# Patient Record
Sex: FEMALE | Race: WHITE | NOT HISPANIC OR LATINO | Employment: STUDENT | URBAN - METROPOLITAN AREA
[De-identification: names, ages, dates, MRNs, and addresses within clinical notes are randomized per-mention and may not be internally consistent; named-entity substitution may affect disease eponyms.]

---
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
## 2019-01-09 ENCOUNTER — OFFICE VISIT (OUTPATIENT)
Dept: PEDIATRICS CLINIC | Age: 19
End: 2019-01-09
Payer: COMMERCIAL

## 2019-01-09 VITALS
WEIGHT: 154 LBS | DIASTOLIC BLOOD PRESSURE: 62 MMHG | BODY MASS INDEX: 23.42 KG/M2 | RESPIRATION RATE: 20 BRPM | SYSTOLIC BLOOD PRESSURE: 106 MMHG | TEMPERATURE: 98.7 F

## 2019-01-09 DIAGNOSIS — J01.01 ACUTE RECURRENT MAXILLARY SINUSITIS: ICD-10-CM

## 2019-01-09 DIAGNOSIS — J02.9 SORE THROAT: Primary | ICD-10-CM

## 2019-01-09 LAB — S PYO AG THROAT QL: NEGATIVE

## 2019-01-09 PROCEDURE — 87880 STREP A ASSAY W/OPTIC: CPT | Performed by: PEDIATRICS

## 2019-01-09 PROCEDURE — 99213 OFFICE O/P EST LOW 20 MIN: CPT | Performed by: PEDIATRICS

## 2019-01-09 RX ORDER — AZITHROMYCIN 250 MG/1
500 TABLET, FILM COATED ORAL EVERY 24 HOURS
Qty: 10 TABLET | Refills: 0 | Status: SHIPPED | OUTPATIENT
Start: 2019-01-09 | End: 2019-01-14

## 2019-01-09 NOTE — PROGRESS NOTES
Assessment/Plan:   RAPID  STREP -  NEG  RX  AMOXIL       Diagnoses and all orders for this visit:    Sore throat  -     POCT rapid strepA  -     Throat culture    Acute recurrent maxillary sinusitis  -     azithromycin (ZITHROMAX) 250 mg tablet; Take 2 tablets (500 mg total) by mouth every 24 hours for 5 days          Subjective:     Patient ID: Lupis Vazquez is a 22 y o  female  SICK  WITH  SORE  THROAT  FOR  2  DAYS  , HAS  EAR  PRESSURE  AND  PAIN  SINCE  YESTERDAY , HAS  A  RUNNY  NOSE , NO  FEVER   ADOLESCENT  HAVE  HAD  SEVERAL  SINUS  INFECTIONS IN THE  PAST        Review of Systems   Constitutional: Negative for activity change, appetite change and fever  HENT: Positive for congestion, ear pain, rhinorrhea, sore throat and voice change (RASPY)  Eyes: Negative for discharge and redness  Respiratory: Positive for cough (DRY  AND  WET  COUGH , FREQUENT  AT  NIGHT )  Negative for wheezing  Gastrointestinal: Negative for abdominal pain, diarrhea, nausea and vomiting  Genitourinary: Negative for dysuria, frequency and urgency  Musculoskeletal: Negative for myalgias  Skin: Negative for rash  Neurological: Negative for headaches  Psychiatric/Behavioral: Negative for sleep disturbance  Objective:     Physical Exam   Constitutional: She appears well-developed and well-nourished  No distress  HENT:   Right Ear: External ear normal    Left Ear: External ear normal    Nose: Mucosal edema (SWELLING  AND  REDNESS ) present  No rhinorrhea  Right sinus exhibits maxillary sinus tenderness and frontal sinus tenderness  Left sinus exhibits maxillary sinus tenderness and frontal sinus tenderness  Mouth/Throat: Posterior oropharyngeal erythema (MILD) present  No oropharyngeal exudate  SINUS  TENDERNESS  MORE  ON  MAXILLARY  AREAS   Eyes: Conjunctivae are normal  Right eye exhibits no discharge  Left eye exhibits no discharge  Neck: Normal range of motion  Neck supple  No thyromegaly present  Cardiovascular: Normal rate, regular rhythm and normal heart sounds  No murmur heard  Pulmonary/Chest: Effort normal and breath sounds normal  No respiratory distress  She has no wheezes  She has no rales  NOT COUGHING  AT  TIME  OF  VISIT, LUNGS  CLEAR   Abdominal: Soft  She exhibits no mass  There is no tenderness  Musculoskeletal: Normal range of motion  She exhibits no tenderness  Lymphadenopathy:     She has no cervical adenopathy  Neurological: She is alert  Skin: Skin is warm  No rash noted  Psychiatric: She has a normal mood and affect  Vitals reviewed

## 2019-01-11 LAB — B-HEM STREP SPEC QL CULT: NEGATIVE

## 2019-01-12 NOTE — PROGRESS NOTES
Assessment     22 y o , continuing OCP (estrogen/progesterone), no contraindications  Time spent counseling 10    Plan     All questions answered  Subjective      Candice Lucia is a 22 y o  female who presents for contraception counseling  The patient is sexually active  Pertinent past medical history: none  Pt does state that she has noticed more deprssed emotional sx over the last few mths  Does not think sx are significant, does not affect daily activity or routine  No SI/HI  Pt does state periods are much improved very light and short and tolerable  Pt is now sexually active  Aware would advise STD screenings at at least yearly intervals  Pt has no concerns for STD work up at this point but will plan next year w APE  Menstrual History:  OB History      Para Term  AB Living    0 0 0 0 0 0    SAB TAB Ectopic Multiple Live Births    0 0 0 0 0         Menarche age: 14  Patient's last menstrual period was 2018  The following portions of the patient's history were reviewed and updated as appropriate: allergies, current medications, past family history, past medical history, past social history, past surgical history and problem list     Review of Systems  Pertinent items are noted in HPI  Objective      /72 (BP Location: Left arm, Patient Position: Sitting, Cuff Size: Standard)   Ht 5' 8" (1 727 m)   Wt 67 1 kg (148 lb)   LMP 2018   BMI 22 50 kg/m²     General:   alert and oriented, in no acute distress   Heart: regular rate and rhythm, S1, S2 normal, no murmur, click, rub or gallop   Lungs: clear to auscultation bilaterally                   BP Recheck 114/72           Plan:   Pt to continue current pills for now  If hormonal sx are worsened or persistent over the next few mths she will consider trial of a new pill

## 2019-01-14 ENCOUNTER — OFFICE VISIT (OUTPATIENT)
Dept: OBGYN CLINIC | Facility: CLINIC | Age: 19
End: 2019-01-14
Payer: COMMERCIAL

## 2019-01-14 VITALS
BODY MASS INDEX: 22.43 KG/M2 | SYSTOLIC BLOOD PRESSURE: 134 MMHG | WEIGHT: 148 LBS | HEIGHT: 68 IN | DIASTOLIC BLOOD PRESSURE: 72 MMHG

## 2019-01-14 DIAGNOSIS — Z30.41 ENCOUNTER FOR SURVEILLANCE OF CONTRACEPTIVE PILLS: Primary | ICD-10-CM

## 2019-01-14 DIAGNOSIS — N92.0 MENORRHAGIA WITH REGULAR CYCLE: ICD-10-CM

## 2019-01-14 PROCEDURE — 99212 OFFICE O/P EST SF 10 MIN: CPT | Performed by: PHYSICIAN ASSISTANT

## 2019-01-14 RX ORDER — NORETHINDRONE ACETATE AND ETHINYL ESTRADIOL 1MG-20(21)
1 KIT ORAL DAILY
Qty: 28 TABLET | Refills: 11 | Status: SHIPPED | OUTPATIENT
Start: 2019-01-14 | End: 2019-12-12 | Stop reason: SDUPTHER

## 2019-09-11 ENCOUNTER — OFFICE VISIT (OUTPATIENT)
Dept: PEDIATRICS CLINIC | Age: 19
End: 2019-09-11
Payer: COMMERCIAL

## 2019-09-11 VITALS
DIASTOLIC BLOOD PRESSURE: 70 MMHG | BODY MASS INDEX: 21.9 KG/M2 | TEMPERATURE: 98 F | SYSTOLIC BLOOD PRESSURE: 116 MMHG | WEIGHT: 144 LBS

## 2019-09-11 DIAGNOSIS — J01.01 ACUTE RECURRENT MAXILLARY SINUSITIS: Primary | ICD-10-CM

## 2019-09-11 PROCEDURE — 99213 OFFICE O/P EST LOW 20 MIN: CPT | Performed by: PEDIATRICS

## 2019-09-11 RX ORDER — AZITHROMYCIN 250 MG/1
TABLET, FILM COATED ORAL
Qty: 6 TABLET | Refills: 0 | Status: SHIPPED | OUTPATIENT
Start: 2019-09-11 | End: 2019-09-16

## 2019-09-11 NOTE — PROGRESS NOTES
Assessment/Plan: I will treat for sinusitis  Follow up prn  Diagnoses and all orders for this visit:    Acute recurrent maxillary sinusitis  -     azithromycin (ZITHROMAX) 250 mg tablet; Take 2 tablets (500 mg total) by mouth every 24 hours for 1 day, THEN 1 tablet (250 mg total) every 24 hours for 4 days  Subjective:      Patient ID: Orly Paulino is a 20 y o  female  Sinus Problem   This is a new problem  The current episode started in the past 7 days  There has been no fever  Associated symptoms include congestion, ear pain, headaches, sinus pressure, sneezing and a sore throat  Pertinent negatives include no chills, hoarse voice or shortness of breath  Past treatments include acetaminophen  The treatment provided mild relief  The following portions of the patient's history were reviewed and updated as appropriate:   She  has no past medical history on file  She   Patient Active Problem List    Diagnosis Date Noted    Encounter for surveillance of contraceptive pills 01/14/2019    Encounter for initial prescription of contraceptives 10/15/2018    Acute recurrent maxillary sinusitis 11/16/2016    Benign pigmented nevus 08/29/2013     She  has a past surgical history that includes Skin lesion excision (Right)  Her family history includes Diabetes in her brother; Hypertension in her father; Obesity in her mother  She  reports that she has never smoked  She has never used smokeless tobacco  She reports that she does not drink alcohol or use drugs  Current Outpatient Medications   Medication Sig Dispense Refill    azithromycin (ZITHROMAX) 250 mg tablet Take 2 tablets (500 mg total) by mouth every 24 hours for 1 day, THEN 1 tablet (250 mg total) every 24 hours for 4 days  6 tablet 0    norethindrone-ethinyl estradiol (JUNEL FE 1/20) 1-20 MG-MCG per tablet Take 1 tablet by mouth daily 28 tablet 11     No current facility-administered medications for this visit        Current Outpatient Medications on File Prior to Visit   Medication Sig    norethindrone-ethinyl estradiol (JUNEL FE 1/20) 1-20 MG-MCG per tablet Take 1 tablet by mouth daily     No current facility-administered medications on file prior to visit  She has No Known Allergies       Review of Systems   Constitutional: Negative for chills  HENT: Positive for congestion, ear pain, sinus pressure, sneezing and sore throat  Negative for hoarse voice  Respiratory: Negative for shortness of breath  Neurological: Positive for headaches  Objective:      /70   Temp 98 °F (36 7 °C)   Wt 65 3 kg (144 lb)   BMI 21 90 kg/m²          Physical Exam   Constitutional: She appears well-developed and well-nourished  No distress  HENT:   Head: Normocephalic and atraumatic  Right Ear: External ear normal    Left Ear: External ear normal    Nose: Nose normal    Mouth/Throat: Oropharynx is clear and moist  No oropharyngeal exudate  Eyes: Pupils are equal, round, and reactive to light  Conjunctivae and EOM are normal  Right eye exhibits no discharge  Left eye exhibits no discharge  Neck: Neck supple  Cardiovascular: Normal rate, regular rhythm and normal heart sounds  No murmur heard  Pulmonary/Chest: Effort normal and breath sounds normal  No respiratory distress  She has no wheezes  She has no rales  Abdominal: She exhibits no distension and no mass  There is no tenderness  There is no guarding  Lymphadenopathy:     She has cervical adenopathy (anterior and tender)  Neurological: She is alert  Skin: Skin is warm  Vitals reviewed

## 2019-10-15 ENCOUNTER — OFFICE VISIT (OUTPATIENT)
Dept: PEDIATRICS CLINIC | Age: 19
End: 2019-10-15
Payer: COMMERCIAL

## 2019-10-15 VITALS
HEART RATE: 72 BPM | RESPIRATION RATE: 12 BRPM | TEMPERATURE: 97.6 F | BODY MASS INDEX: 23.54 KG/M2 | DIASTOLIC BLOOD PRESSURE: 70 MMHG | SYSTOLIC BLOOD PRESSURE: 118 MMHG | WEIGHT: 150 LBS | HEIGHT: 67 IN

## 2019-10-15 DIAGNOSIS — Z13.31 NEGATIVE DEPRESSION SCREENING: ICD-10-CM

## 2019-10-15 DIAGNOSIS — Z00.00 ENCOUNTER FOR WELL ADULT EXAM WITHOUT ABNORMAL FINDINGS: Primary | ICD-10-CM

## 2019-10-15 PROBLEM — Z30.41 ENCOUNTER FOR SURVEILLANCE OF CONTRACEPTIVE PILLS: Status: RESOLVED | Noted: 2019-01-14 | Resolved: 2019-10-15

## 2019-10-15 PROBLEM — Z30.019 ENCOUNTER FOR INITIAL PRESCRIPTION OF CONTRACEPTIVES: Status: RESOLVED | Noted: 2018-10-15 | Resolved: 2019-10-15

## 2019-10-15 PROCEDURE — 99395 PREV VISIT EST AGE 18-39: CPT | Performed by: PEDIATRICS

## 2019-10-15 PROCEDURE — 99173 VISUAL ACUITY SCREEN: CPT | Performed by: PEDIATRICS

## 2019-10-15 NOTE — PROGRESS NOTES
Subjective:     Kayy Hankins is a 20 y o  female who is brought in for this well child visit  History provided by: patient and mother    Current Issues:  Current concerns: none  regular periods, no issues    The following portions of the patient's history were reviewed and updated as appropriate:   She  has no past medical history on file  She   Patient Active Problem List    Diagnosis Date Noted    Benign pigmented nevus 08/29/2013     She  has a past surgical history that includes Skin lesion excision (Right)  Her family history includes Diabetes in her brother; Hypertension in her father; Obesity in her mother  She  reports that she has never smoked  She has never used smokeless tobacco  She reports that she does not drink alcohol or use drugs  Current Outpatient Medications   Medication Sig Dispense Refill    norethindrone-ethinyl estradiol (JUNEL FE 1/20) 1-20 MG-MCG per tablet Take 1 tablet by mouth daily 28 tablet 11     No current facility-administered medications for this visit  Current Outpatient Medications on File Prior to Visit   Medication Sig    norethindrone-ethinyl estradiol (JUNEL FE 1/20) 1-20 MG-MCG per tablet Take 1 tablet by mouth daily     No current facility-administered medications on file prior to visit  She has No Known Allergies       Well Child Assessment:  Darral Nissen lives with her mother, father and brother  Interval problems include recent illness (sinusitis have resolved)  Interval problems do not include recent injury  Nutrition  Types of intake include vegetables, fruits, meats, eggs, cow's milk, fish, cereals and junk food  Junk food includes desserts and fast food  Dental  The patient has a dental home  The patient brushes teeth regularly  The patient does not floss regularly  Last dental exam was less than 6 months ago  Elimination  Elimination problems do not include constipation, diarrhea or urinary symptoms  There is no bed wetting  Behavioral  Behavioral issues do not include performing poorly at school  Sleep  Average sleep duration (hrs): 4-8  The patient does not snore  There are no sleep problems  Safety  There is no smoking in the home  Home has working smoke alarms? yes  Home has working carbon monoxide alarms? yes  There is no gun in home  School  Grade level in school: [employer] 2nd year  There are no signs of learning disabilities  Child is doing well in school  Review of Systems   Constitutional: Negative for appetite change and fever  HENT: Negative for congestion, ear discharge, ear pain, rhinorrhea and sore throat  Eyes: Negative for discharge, redness and itching  Respiratory: Negative for snoring, cough and chest tightness  Cardiovascular: Negative for chest pain  Gastrointestinal: Negative for abdominal pain, constipation, diarrhea, nausea and vomiting  Genitourinary: Negative for difficulty urinating  Skin: Negative for rash  Neurological: Negative for headaches  Psychiatric/Behavioral: Negative for sleep disturbance  The patient is not nervous/anxious  Objective:       Vitals:    10/15/19 1058   BP: 118/70   Pulse: 72   Resp: 12   Temp: 97 6 °F (36 4 °C)   Weight: 68 kg (150 lb)   Height: 5' 7 25" (1 708 m)     Growth parameters are noted and are appropriate for age  Wt Readings from Last 1 Encounters:   10/15/19 68 kg (150 lb)     Ht Readings from Last 1 Encounters:   10/15/19 5' 7 25" (1 708 m)      Body mass index is 23 32 kg/m²      Vitals:    10/15/19 1058   BP: 118/70   Pulse: 72   Resp: 12   Temp: 97 6 °F (36 4 °C)   Weight: 68 kg (150 lb)   Height: 5' 7 25" (1 708 m)        Hearing Screening    125Hz 250Hz 500Hz 1000Hz 2000Hz 3000Hz 4000Hz 6000Hz 8000Hz   Right ear:     15 15 15     Left ear:     15 15 15     Comments: Pass bilat  R 5000hz 15db  L 5000hz 15db     Visual Acuity Screening    Right eye Left eye Both eyes   Without correction: 20/20 20/20 20/20   With correction:          Physical Exam   Constitutional: She is oriented to person, place, and time  She appears well-developed and well-nourished  No distress  HENT:   Head: Normocephalic and atraumatic  Right Ear: Tympanic membrane and external ear normal    Left Ear: Tympanic membrane and external ear normal    Nose: Nose normal    Mouth/Throat: Oropharynx is clear and moist  No oropharyngeal exudate  Eyes: Pupils are equal, round, and reactive to light  Conjunctivae and EOM are normal  Right eye exhibits no discharge  Left eye exhibits no discharge  Fundi clear   Neck: Normal range of motion  Neck supple  No thyromegaly present  Cardiovascular: Normal rate, regular rhythm and normal heart sounds  No murmur heard  Pulmonary/Chest: Effort normal and breath sounds normal  No respiratory distress  She has no wheezes  She has no rales  Abdominal: Soft  Bowel sounds are normal  She exhibits no distension and no mass  There is no tenderness  There is no guarding  Genitourinary:   Genitourinary Comments: Grover 5 female   Musculoskeletal: Normal range of motion  No scoliosis   Lymphadenopathy:     She has no cervical adenopathy  Neurological: She is alert and oriented to person, place, and time  She has normal reflexes  She displays normal reflexes  No cranial nerve deficit  She exhibits normal muscle tone  Skin: Skin is dry  Diffuse nevi  Psychiatric: She has a normal mood and affect  Her behavior is normal  Judgment and thought content normal    Nursing note and vitals reviewed  Assessment:     Well adolescent  1  Encounter for well adult exam without abnormal findings     2  Negative depression screening     3  BMI 23 0-23 9, adult          Plan:     Referral to dermatology to check multiple moles  1  Anticipatory guidance discussed  Specific topics reviewed: breast self-exam     Nutrition and Exercise Counseling: The patient's Body mass index is 23 32 kg/m²   This is Facility age limit for growth percentiles is 20 years  Nutrition counseling provided:  Reviewed long term health goals and risks of obesity    Exercise counseling provided:  Anticipatory guidance and counseling on exercise and physical activity given      2  Depression screen performed:       Patient screened- Negative    3  Development: appropriate for age    3  Immunizations today: none  Hesitation to all the recommended vaccinations( Influenza and HPV) along with the risk of not vaccinating was addressed  5  Follow-up visit in 1 year for next well child visit, or sooner as needed

## 2019-12-12 DIAGNOSIS — N92.0 MENORRHAGIA WITH REGULAR CYCLE: ICD-10-CM

## 2019-12-12 RX ORDER — NORETHINDRONE ACETATE AND ETHINYL ESTRADIOL AND FERROUS FUMARATE 1MG-20(21)
KIT ORAL
Qty: 28 TABLET | Refills: 1 | Status: SHIPPED | OUTPATIENT
Start: 2019-12-12 | End: 2020-01-14 | Stop reason: SDUPTHER

## 2020-01-14 ENCOUNTER — TELEPHONE (OUTPATIENT)
Dept: OBGYN CLINIC | Facility: CLINIC | Age: 20
End: 2020-01-14

## 2020-01-14 ENCOUNTER — HOSPITAL ENCOUNTER (EMERGENCY)
Facility: HOSPITAL | Age: 20
Discharge: HOME | End: 2020-01-14
Attending: EMERGENCY MEDICINE | Admitting: EMERGENCY MEDICINE
Payer: COMMERCIAL

## 2020-01-14 VITALS
TEMPERATURE: 97 F | HEART RATE: 78 BPM | RESPIRATION RATE: 18 BRPM | DIASTOLIC BLOOD PRESSURE: 96 MMHG | SYSTOLIC BLOOD PRESSURE: 137 MMHG | WEIGHT: 156.53 LBS | BODY MASS INDEX: 24.33 KG/M2 | OXYGEN SATURATION: 100 %

## 2020-01-14 DIAGNOSIS — N92.0 MENORRHAGIA WITH REGULAR CYCLE: ICD-10-CM

## 2020-01-14 DIAGNOSIS — S61.019A THUMB LACERATION: Primary | ICD-10-CM

## 2020-01-14 PROCEDURE — 99282 EMERGENCY DEPT VISIT SF MDM: CPT

## 2020-01-14 PROCEDURE — 12001 RPR S/N/AX/GEN/TRNK 2.5CM/<: CPT | Performed by: PHYSICIAN ASSISTANT

## 2020-01-14 PROCEDURE — 90715 TDAP VACCINE 7 YRS/> IM: CPT | Performed by: PHYSICIAN ASSISTANT

## 2020-01-14 PROCEDURE — 90471 IMMUNIZATION ADMIN: CPT

## 2020-01-14 PROCEDURE — 99282 EMERGENCY DEPT VISIT SF MDM: CPT | Performed by: PHYSICIAN ASSISTANT

## 2020-01-14 RX ADMIN — TETANUS TOXOID, REDUCED DIPHTHERIA TOXOID AND ACELLULAR PERTUSSIS VACCINE, ADSORBED 0.5 ML: 5; 2.5; 8; 8; 2.5 SUSPENSION INTRAMUSCULAR at 14:35

## 2020-01-14 NOTE — ED PROVIDER NOTES
History  Chief Complaint   Patient presents with    Laceration     patient states she cut her left index finger on a metal lid  59-year-old female presenting today with a left thumb laceration that occurred about an hour ago after she sliced it on a piece of serrated metal from the limited full container  States that the bleeding has stopped  She did miss her last tetanus vaccination and is unsure when her last tetanus was  Minimal amount of pain  Denies numbness, paresthesias  Prior to Admission Medications   Prescriptions Last Dose Informant Patient Reported? Taking? JUNEL FE 1/20 1-20 MG-MCG per tablet   No Yes   Sig: TAKE ONE TABLET ONCE DAILY BY MOUTH      Facility-Administered Medications: None       History reviewed  No pertinent past medical history  Past Surgical History:   Procedure Laterality Date    SKIN LESION EXCISION Right     4th toe       Family History   Problem Relation Age of Onset    Obesity Mother     Hypertension Father     Diabetes Brother      I have reviewed and agree with the history as documented  Social History     Tobacco Use    Smoking status: Never Smoker    Smokeless tobacco: Never Used   Substance Use Topics    Alcohol use: No    Drug use: No        Review of Systems   Constitutional: Negative  HENT: Negative  Respiratory: Negative  Cardiovascular: Negative  Gastrointestinal: Negative  Genitourinary: Negative  Musculoskeletal: Negative  Skin: Positive for wound  Negative for color change, pallor and rash  Neurological: Negative  All other systems reviewed and are negative  Physical Exam  Physical Exam   Constitutional: She is oriented to person, place, and time  She appears well-developed and well-nourished  HENT:   Head: Normocephalic and atraumatic  Nose: Nose normal    Eyes: Conjunctivae are normal    Cardiovascular: Normal rate and intact distal pulses     Pulmonary/Chest: Effort normal    S PO2 is 100% indicating adequate oxygenation   Musculoskeletal: Normal range of motion  She exhibits tenderness  She exhibits no edema or deformity  Arms:  Neurological: She is alert and oriented to person, place, and time  Skin: Skin is warm and dry  Capillary refill takes less than 2 seconds  Nursing note and vitals reviewed  Vital Signs  ED Triage Vitals [01/14/20 1352]   Temperature Pulse Respirations Blood Pressure SpO2   (!) 97 °F (36 1 °C) 78 18 137/96 100 %      Temp Source Heart Rate Source Patient Position - Orthostatic VS BP Location FiO2 (%)   Tympanic Monitor Sitting Right arm --      Pain Score       --           Vitals:    01/14/20 1352   BP: 137/96   Pulse: 78   Patient Position - Orthostatic VS: Sitting         Visual Acuity      ED Medications  Medications - No data to display    Diagnostic Studies  Results Reviewed     None                 No orders to display              Procedures  Procedures         ED Course                               MDM  Number of Diagnoses or Management Options  Diagnosis management comments: Thoroughly cleansed wound with Betadine and saline, it is not gaping, there is no adipose involvement  Glue place with 1 Steri-Strips and also placed in a thumb splint  Patient is refusing tetanus vaccination this time after proper encouragement education given by me and the medical staff  Informed to return emergency department for any signs infection including not limited to spreading redness, drainage  Patient is informed to return to the emergency department for worsening of symptoms and was given proper education regarding their diagnosis and symptoms  Otherwise the patient is informed to follow up with their primary care doctor for re-evaluation  The patient verbalizes understanding and agrees with above assessment and plan  All questions were answered  Please Note: Fluency Direct voice recognition software may have been used in the creation of this document  Wrong words or sound a like substitutions may have occurred due to the inherent limitations of the voice software  Amount and/or Complexity of Data Reviewed  Review and summarize past medical records: yes  Independent visualization of images, tracings, or specimens: yes          Disposition  Final diagnoses:   Thumb laceration     Time reflects when diagnosis was documented in both MDM as applicable and the Disposition within this note     Time User Action Codes Description Comment    1/14/2020  2:22 PM [address], [address] Thumb laceration       ED Disposition     ED Disposition Condition Date/Time Comment    Discharge Stable Tue Jan 14, 2020  2:22 PM Maninder Landin discharge to home/self care  Follow-up Information     Follow up With Specialties Details Why Contact Info Additional [phone] Emergency Department Emergency Medicine Go to  If symptoms worsen such as spreading redness, drainage etc [address] [place] [address]  [phone] [facility], [place], [place], [address]          Patient's Medications   Discharge Prescriptions    No medications on file     No discharge procedures on file      ED Provider  Electronically Signed by           Konstantin Inman PA-C  01/14/20 5951

## 2020-01-15 RX ORDER — NORETHINDRONE ACETATE AND ETHINYL ESTRADIOL 1MG-20(21)
1 KIT ORAL EVERY MORNING
Qty: 28 TABLET | Refills: 0 | Status: SHIPPED | OUTPATIENT
Start: 2020-01-15 | End: 2020-02-15 | Stop reason: SDUPTHER

## 2020-01-17 ENCOUNTER — OFFICE VISIT (OUTPATIENT)
Dept: PEDIATRICS CLINIC | Age: 20
End: 2020-01-17
Payer: COMMERCIAL

## 2020-01-17 VITALS
TEMPERATURE: 98 F | BODY MASS INDEX: 23.32 KG/M2 | DIASTOLIC BLOOD PRESSURE: 72 MMHG | WEIGHT: 150 LBS | SYSTOLIC BLOOD PRESSURE: 118 MMHG

## 2020-01-17 DIAGNOSIS — M54.6 ACUTE RIGHT-SIDED THORACIC BACK PAIN: ICD-10-CM

## 2020-01-17 DIAGNOSIS — J01.10 ACUTE NON-RECURRENT FRONTAL SINUSITIS: Primary | ICD-10-CM

## 2020-01-17 PROCEDURE — 99213 OFFICE O/P EST LOW 20 MIN: CPT | Performed by: PEDIATRICS

## 2020-01-17 RX ORDER — AZITHROMYCIN 250 MG/1
TABLET, FILM COATED ORAL
Qty: 6 TABLET | Refills: 0 | Status: SHIPPED | OUTPATIENT
Start: 2020-01-17 | End: 2020-01-22

## 2020-01-17 NOTE — PROGRESS NOTES
Assessment/Plan: I will treat for frontal sinusitis  Follow up prn  Use a heating pad for the back pain  Was in the ED for a finger laceration  The laceration is healing well  Diagnoses and all orders for this visit:    Acute non-recurrent frontal sinusitis  -     azithromycin (ZITHROMAX) 250 mg tablet; Take 2 tablets (500 mg total) by mouth every 24 hours for 1 day, THEN 1 tablet (250 mg total) every 24 hours for 4 days  Acute right-sided thoracic back pain          Subjective:      Patient ID: Latrice Canales is a 20 y o  female  Sinus Problem   This is a new problem  The current episode started in the past 7 days  The problem has been gradually worsening since onset  There has been no fever  Associated symptoms include congestion, coughing, headaches, sinus pressure, sneezing and a sore throat  Pertinent negatives include no chills, ear pain or swollen glands  (Ear congestion) Past treatments include acetaminophen and oral decongestants  The treatment provided mild relief  Cough   This is a new problem  The current episode started in the past 7 days  The cough is productive of sputum  Associated symptoms include headaches, nasal congestion and a sore throat  Pertinent negatives include no chills, ear pain or rhinorrhea  Nothing aggravates the symptoms  She has tried OTC cough suppressant for the symptoms  The treatment provided mild relief  The following portions of the patient's history were reviewed and updated as appropriate:   She  has no past medical history on file  She   Patient Active Problem List    Diagnosis Date Noted    Acute right-sided thoracic back pain 01/17/2020    Acute non-recurrent frontal sinusitis 05/09/2016    Benign pigmented nevus 08/29/2013     She  has a past surgical history that includes Skin lesion excision (Right)  Her family history includes Diabetes in her brother; Hypertension in her father; Obesity in her mother    She  reports that she has never smoked  She has never used smokeless tobacco  She reports that she does not drink alcohol or use drugs  Current Outpatient Medications   Medication Sig Dispense Refill    azithromycin (ZITHROMAX) 250 mg tablet Take 2 tablets (500 mg total) by mouth every 24 hours for 1 day, THEN 1 tablet (250 mg total) every 24 hours for 4 days  6 tablet 0    norethindrone-ethinyl estradiol (JUNEL FE 1/20) 1-20 MG-MCG per tablet Take 1 tablet by mouth every morning 28 tablet 0     No current facility-administered medications for this visit  Current Outpatient Medications on File Prior to Visit   Medication Sig    norethindrone-ethinyl estradiol (JUNEL FE 1/20) 1-20 MG-MCG per tablet Take 1 tablet by mouth every morning     No current facility-administered medications on file prior to visit  She has No Known Allergies       Review of Systems   Constitutional: Negative for chills  HENT: Positive for congestion, sinus pressure, sneezing and sore throat  Negative for ear pain and rhinorrhea  Respiratory: Positive for cough  Musculoskeletal: Positive for back pain (right sided x 1-2 weeks  )  Neurological: Positive for headaches  Objective:      /72   Temp 98 °F (36 7 °C)   Wt 68 kg (150 lb)   BMI 23 32 kg/m²          Physical Exam   Constitutional: She appears well-developed and well-nourished  No distress  HENT:   Head: Normocephalic and atraumatic  Right Ear: External ear normal    Left Ear: External ear normal    Nose: Nose normal    Mouth/Throat: Oropharynx is clear and moist  No oropharyngeal exudate  Eyes: Pupils are equal, round, and reactive to light  Conjunctivae and EOM are normal  Right eye exhibits no discharge  Left eye exhibits no discharge  Neck: Neck supple  Cardiovascular: Normal rate, regular rhythm and normal heart sounds  No murmur heard  Pulmonary/Chest: Effort normal and breath sounds normal  No respiratory distress  She has no wheezes  She has no rales  Abdominal: Soft  Bowel sounds are normal  She exhibits no distension and no mass  There is no tenderness  There is no guarding  Musculoskeletal:   Right sided upper thoracic pain to the touch   Lymphadenopathy:     She has no cervical adenopathy  Neurological: She is alert  Skin: Skin is warm  Vitals reviewed

## 2020-02-12 PROBLEM — Z01.419 WELL WOMAN EXAM: Status: ACTIVE | Noted: 2020-02-12

## 2020-02-12 NOTE — PROGRESS NOTES
Assessment/Plan   Diagnoses and all orders for this visit:    Well woman exam  -     Chlamydia/GC amplified DNA by PCR    Menorrhagia with regular cycle  -     norethindrone-ethinyl estradiol (Junel FE 1/20) 1-20 MG-MCG per tablet; Take 1 tablet by mouth every morning    RLQ fullness  -     US pelvis complete w transvaginal; Future        Discussion    Pap smears will start at age 23 per the ASCCP guidelines  All questions have been answered to her satisfaction  RTO for APE or sooner if needed    Subjective     Pt for first annual GYN Exam/pelvic exam  Pt currently on OCPs  Doing well  She did have some BTB after she messed up her pills but in general doing well  Pt is now sexually active, 2 lifetime partners  No issues w IC  Declines STD work up  I did r/w pt CDC guidelines, will plan screening today  Without GYN complaints or concerns  No issues w IC  Dave Qiu is a 20 y o  female who presents for annual well woman exam    Menarche -14 ; LMP - ; Periods are reg q 28 days and last 5  days; No excessive bleeding; No intermenstrual bleeding or spotting; Cramps are tolerable  No vulvar itch/burn; No vaginal itch/burn; No abn discharge or odor; No urinary sx - burning/pain/frequency/hematuria  (+) SBEs - no breast masses, asymmetry, nipple discharge or bleeding, changes in skin of breast, or breast tenderness bilaterally  No abd/pelvic pain or HAs;   Pt is sexually active in a mutually monog sexual relationship x 6 mths; Second lifetime partner  No issues with intercourse; She desires std cx's but declines BW at this point; Feels safe at home  Current contraception: OCPs    Gardasil - has not had it, declines it at this point  I did r/ w pt and mother recommendations and coverage for HPV  May further consider but declines for now  Handout given  (+) PCP for routine Bw/care; Last Pap - NA  Last STD screen -today    Review of Systems   Constitutional: Negative  Respiratory: Negative  Gastrointestinal: Negative  Endocrine: Negative  Genitourinary: Negative  The following portions of the patient's history were reviewed and updated as appropriate: allergies, current medications, past family history, past medical history, past social history, past surgical history and problem list          OB History        0    Para   0    Term   0       0    AB   0    Living   0       SAB   0    TAB   0    Ectopic   0    Multiple   0    Live Births   0                 History reviewed  No pertinent past medical history      Past Surgical History:   Procedure Laterality Date    SKIN LESION EXCISION Right     4th toe       Family History   Problem Relation Age of Onset    Obesity Mother     Hypertension Father     Diabetes Brother        Social History     Socioeconomic History    Marital status: Single     Spouse name: Not on file    Number of children: Not on file    Years of education: Not on file    Highest education level: Not on file   Occupational History    Not on file   Social Needs    Financial resource strain: Not on file    Food insecurity:     Worry: Not on file     Inability: Not on file    Transportation needs:     Medical: Not on file     Non-medical: Not on file   Tobacco Use    Smoking status: Never Smoker    Smokeless tobacco: Never Used   Substance and Sexual Activity    Alcohol use: No    Drug use: No    Sexual activity: Never   Lifestyle    Physical activity:     Days per week: Not on file     Minutes per session: Not on file    Stress: Not on file   Relationships    Social connections:     Talks on phone: Not on file     Gets together: Not on file     Attends Faith service: Not on file     Active member of club or organization: Not on file     Attends meetings of clubs or organizations: Not on file     Relationship status: Not on file    Intimate partner violence:     Fear of current or ex partner: Not on file     Emotionally abused: Not on file     Physically abused: Not on file     Forced sexual activity: Not on file   Other Topics Concern    Not on file   Social History Narrative    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             Current Outpatient Medications:     norethindrone-ethinyl estradiol (Junel FE 1/20) 1-20 MG-MCG per tablet, Take 1 tablet by mouth every morning, Disp: 28 tablet, Rfl: 11    No Known Allergies    Objective   Vitals:    02/15/20 1110   BP: 120/70   Weight: 68 kg (150 lb)   Height: 5' 7" (1 702 m)     Physical Exam   Constitutional: She is oriented to person, place, and time  She appears well-developed and well-nourished  HENT:   Head: Normocephalic and atraumatic  Cardiovascular: Normal rate and regular rhythm  Pulmonary/Chest: Effort normal and breath sounds normal  Right breast exhibits no inverted nipple, no mass, no nipple discharge, no skin change and no tenderness  Left breast exhibits no inverted nipple, no mass, no nipple discharge, no skin change and no tenderness  Breasts are symmetrical    Abdominal: Soft  She exhibits no distension and no mass  There is no rebound and no guarding  Genitourinary: Uterus normal  Pelvic exam was performed with patient supine  There is no rash on the right labia  There is no rash on the left labia  Cervix exhibits no motion tenderness  Right adnexum displays fullness  Right adnexum displays no tenderness  Left adnexum displays no mass, no tenderness and no fullness  No erythema in the vagina  No foreign body in the vagina  No signs of injury around the vagina  No vaginal discharge found  Genitourinary Comments: On exam, RLQ fullness noted, no pain or discomfort noted  Will plan pelvic US and f/u as needed  Neurological: She is alert and oriented to person, place, and time  Skin: Skin is warm and dry  Psychiatric: She has a normal mood and affect  Patient Instructions   Pap not done due to age and guidelines  Chlam/gc done today  Continue current OCPs     Will plan pelvic US  F/u 1 year, earlier as needed

## 2020-02-15 ENCOUNTER — ANNUAL EXAM (OUTPATIENT)
Dept: OBGYN CLINIC | Facility: CLINIC | Age: 20
End: 2020-02-15
Payer: COMMERCIAL

## 2020-02-15 VITALS
HEIGHT: 67 IN | SYSTOLIC BLOOD PRESSURE: 120 MMHG | DIASTOLIC BLOOD PRESSURE: 70 MMHG | WEIGHT: 150 LBS | BODY MASS INDEX: 23.54 KG/M2

## 2020-02-15 DIAGNOSIS — R19.8 RLQ FULLNESS: ICD-10-CM

## 2020-02-15 DIAGNOSIS — Z01.419 WELL WOMAN EXAM: Primary | ICD-10-CM

## 2020-02-15 DIAGNOSIS — N92.0 MENORRHAGIA WITH REGULAR CYCLE: ICD-10-CM

## 2020-02-15 PROCEDURE — S0612 ANNUAL GYNECOLOGICAL EXAMINA: HCPCS | Performed by: PHYSICIAN ASSISTANT

## 2020-02-15 RX ORDER — NORETHINDRONE ACETATE AND ETHINYL ESTRADIOL 1MG-20(21)
1 KIT ORAL EVERY MORNING
Qty: 28 TABLET | Refills: 11 | Status: SHIPPED | OUTPATIENT
Start: 2020-02-15 | End: 2020-03-09

## 2020-02-15 NOTE — PATIENT INSTRUCTIONS
Pap not done due to age and guidelines  Chlam/gc done today  Continue current OCPs  Will plan pelvic US  F/u 1 year, earlier as needed

## 2020-03-03 ENCOUNTER — TELEPHONE (OUTPATIENT)
Dept: OBGYN CLINIC | Facility: CLINIC | Age: 20
End: 2020-03-03

## 2020-03-03 NOTE — TELEPHONE ENCOUNTER
Pt mom aware test was canceled due to no aptima swab received and will research into what happened  Mom states it was definitely done because she was in the room  Called [facility] was canceled due to no specimen received  Does say that they did receive another specimen but has been discarded at this point  Was unable to locate if specimen was sent out due to wasn't registered in pap book  Specimen collected on 2/15  Please review and let me know what needs to be done  Patient was in on a Saturday

## 2020-03-04 NOTE — TELEPHONE ENCOUNTER
This was without a doubt done in the office that day  I don't severino STD cx's on my pap book  I even remember confirming that we could send it to [facility] since she had  [place] insurance and that I did the right cx    I wanted to make sure it didn't need to go to [facility]

## 2020-03-04 NOTE — TELEPHONE ENCOUNTER
Can you please review this with Ruben Gonzalez? She was working with you this day  I am not sure what to do going forward

## 2020-03-05 NOTE — TELEPHONE ENCOUNTER
Patient mom called in stating she called a couple days ago about daughter's test results and still hasnt received any feedback  Patient mom placed on hold and reviewed patient chart  See recommendation is can do urine  Reviewed with patient mom, specimen ordered incorrectly per Itzel Keene and specimen was no longer viable so discarded  Reviewed options with patient mom ie urine or repeat swab  States daughter is away right now but will let her know and call back with her decision

## 2020-03-05 NOTE — TELEPHONE ENCOUNTER
Discussed with Xi Flannery , I looked into it the lab should have received both a PAP and a Aptima swab , since test was canceled specimen is now no longer viable , not sure who [facility] spoke with at the office and could not clarify test , but we will repeat test via a urine sample have patient come to office [place] or [place] and leave a sample to send out    MM CMA

## 2020-03-09 DIAGNOSIS — N92.0 MENORRHAGIA WITH REGULAR CYCLE: ICD-10-CM

## 2020-03-09 RX ORDER — NORETHINDRONE ACETATE AND ETHINYL ESTRADIOL AND FERROUS FUMARATE 1MG-20(21)
KIT ORAL
Qty: 28 TABLET | Refills: 1 | Status: SHIPPED | OUTPATIENT
Start: 2020-03-09 | End: 2020-04-07

## 2020-04-06 DIAGNOSIS — N92.0 MENORRHAGIA WITH REGULAR CYCLE: ICD-10-CM

## 2020-04-07 RX ORDER — NORETHINDRONE ACETATE AND ETHINYL ESTRADIOL AND FERROUS FUMARATE 1MG-20(21)
KIT ORAL
Qty: 28 TABLET | Refills: 1 | Status: SHIPPED | OUTPATIENT
Start: 2020-04-07 | End: 2020-06-01

## 2020-06-01 DIAGNOSIS — N92.0 MENORRHAGIA WITH REGULAR CYCLE: ICD-10-CM

## 2020-06-01 RX ORDER — NORETHINDRONE ACETATE AND ETHINYL ESTRADIOL AND FERROUS FUMARATE 1MG-20(21)
KIT ORAL
Qty: 28 TABLET | Refills: 1 | Status: SHIPPED | OUTPATIENT
Start: 2020-06-01 | End: 2020-07-27

## 2020-06-03 ENCOUNTER — TELEPHONE (OUTPATIENT)
Dept: OBGYN CLINIC | Facility: CLINIC | Age: 20
End: 2020-06-03

## 2020-06-05 ENCOUNTER — TELEPHONE (OUTPATIENT)
Dept: OBGYN CLINIC | Facility: CLINIC | Age: 20
End: 2020-06-05

## 2020-07-27 DIAGNOSIS — N92.0 MENORRHAGIA WITH REGULAR CYCLE: ICD-10-CM

## 2020-07-27 RX ORDER — NORETHINDRONE ACETATE AND ETHINYL ESTRADIOL AND FERROUS FUMARATE 1MG-20(21)
KIT ORAL
Qty: 28 TABLET | Refills: 1 | Status: SHIPPED | OUTPATIENT
Start: 2020-07-27 | End: 2020-09-22

## 2020-09-17 ENCOUNTER — OFFICE VISIT (OUTPATIENT)
Dept: PEDIATRICS CLINIC | Age: 20
End: 2020-09-17
Payer: COMMERCIAL

## 2020-09-17 VITALS
TEMPERATURE: 98 F | SYSTOLIC BLOOD PRESSURE: 118 MMHG | HEART RATE: 78 BPM | WEIGHT: 155 LBS | HEIGHT: 68 IN | RESPIRATION RATE: 18 BRPM | BODY MASS INDEX: 23.49 KG/M2 | DIASTOLIC BLOOD PRESSURE: 78 MMHG

## 2020-09-17 DIAGNOSIS — Z00.00 ENCOUNTER FOR WELL ADULT EXAM WITHOUT ABNORMAL FINDINGS: Primary | ICD-10-CM

## 2020-09-17 DIAGNOSIS — Z13.31 NEGATIVE DEPRESSION SCREENING: ICD-10-CM

## 2020-09-17 PROBLEM — M54.6 ACUTE RIGHT-SIDED THORACIC BACK PAIN: Status: RESOLVED | Noted: 2020-01-17 | Resolved: 2020-09-17

## 2020-09-17 PROCEDURE — 99395 PREV VISIT EST AGE 18-39: CPT | Performed by: PEDIATRICS

## 2020-09-17 PROCEDURE — 99173 VISUAL ACUITY SCREEN: CPT | Performed by: PEDIATRICS

## 2020-09-17 NOTE — PROGRESS NOTES
Subjective:     Nancy Griffith is a 23 y o  female who is brought in for this well child visit  History provided by: patient    Current Issues:  Current concerns: none  regular periods, no issues    The following portions of the patient's history were reviewed and updated as appropriate:   She  has no past medical history on file  She   Patient Active Problem List    Diagnosis Date Noted    Well woman exam 02/12/2020    Benign pigmented nevus 08/29/2013     She  has a past surgical history that includes Skin lesion excision (Right)  Her family history includes Diabetes in her brother; Hypertension in her father; Obesity in her mother  She  reports that she has never smoked  She has never used smokeless tobacco  She reports current alcohol use  She reports that she does not use drugs  Current Outpatient Medications   Medication Sig Dispense Refill    JUNEL FE 1/20 1-20 MG-MCG per tablet TAKE ONE TABLET ONCE DAILY BY MOUTH 28 tablet 1     No current facility-administered medications for this visit  Current Outpatient Medications on File Prior to Visit   Medication Sig    JUNEL FE 1/20 1-20 MG-MCG per tablet TAKE ONE TABLET ONCE DAILY BY MOUTH     No current facility-administered medications on file prior to visit  She has No Known Allergies       Well Child Assessment:  Pauly Garcia lives with her mother, father and brother  Interval problems do not include recent illness or recent injury  Nutrition  Types of intake include vegetables, fruits, meats, fish, eggs, cereals and junk food  Junk food includes desserts  Dental  The patient has a dental home  The patient brushes teeth regularly  The patient does not floss regularly  Last dental exam was less than 6 months ago  Elimination  Elimination problems do not include constipation, diarrhea or urinary symptoms  There is no bed wetting  Sleep  Average sleep duration (hrs): 7-8  The patient does not snore  There are no sleep problems  Safety  There is no smoking in the home  Home has working smoke alarms? yes  Home has working carbon monoxide alarms? 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  There are no signs of learning disabilities  Child is doing well in school  Social  Sibling interactions are good  Screen time per day: Over 2 hours a day  Review of Systems   Constitutional: Negative for appetite change and fever  HENT: Negative for congestion, ear discharge, ear pain, rhinorrhea and sore throat  Eyes: Negative for discharge, redness and itching  Respiratory: Negative for snoring, cough and chest tightness  Cardiovascular: Negative for chest pain  Gastrointestinal: Negative for abdominal pain, constipation, diarrhea, nausea and vomiting  Genitourinary: Negative for difficulty urinating  Skin: Negative for rash  Neurological: Negative for headaches  Psychiatric/Behavioral: Negative for sleep disturbance  The patient is not nervous/anxious  Objective:       Vitals:    09/17/20 1035   BP: 118/78   BP Location: Left arm   Patient Position: Sitting   Cuff Size: Standard   Pulse: 78   Resp: 18   Temp: 98 °F (36 7 °C)   TempSrc: Temporal   Weight: 70 3 kg (155 lb)   Height: 5' 7 5" (1 715 m)     Growth parameters are noted and are appropriate for age  Wt Readings from Last 1 Encounters:   09/17/20 70 3 kg (155 lb)     Ht Readings from Last 1 Encounters:   09/17/20 5' 7 5" (1 715 m)      Body mass index is 23 92 kg/m²      Vitals:    09/17/20 1035   BP: 118/78   BP Location: Left arm   Patient Position: Sitting   Cuff Size: Standard   Pulse: 78   Resp: 18   Temp: 98 °F (36 7 °C)   TempSrc: Temporal   Weight: 70 3 kg (155 lb)   Height: 5' 7 5" (1 715 m)        Hearing Screening    Method: Otoacoustic emissions    125Hz 250Hz 500Hz 1000Hz 2000Hz 3000Hz 4000Hz 6000Hz 8000Hz   Right ear:     15 15 15     Left ear:     15 15 15     Comments: Bilateral pass  Right ear 5000 HZ - 15 DB Left ear 5000 HZ - 15 DB      Visual Acuity Screening    Right eye Left eye Both eyes   Without correction: 20/20 20/20 20/20   With correction:          Physical Exam  Vitals signs and nursing note reviewed  Exam conducted with a chaperone present  Constitutional:       General: She is not in acute distress  Appearance: Normal appearance  She is well-developed and normal weight  She is not ill-appearing  HENT:      Head: Normocephalic and atraumatic  Right Ear: Tympanic membrane, ear canal and external ear normal       Left Ear: Tympanic membrane, ear canal and external ear normal       Nose: Nose normal  No congestion or rhinorrhea  Mouth/Throat:      Mouth: Mucous membranes are moist       Pharynx: Oropharyngeal exudate (mucus in the posterior pharynx) present  No posterior oropharyngeal erythema  Eyes:      General:         Right eye: No discharge  Left eye: No discharge  Extraocular Movements: Extraocular movements intact  Conjunctiva/sclera: Conjunctivae normal       Pupils: Pupils are equal, round, and reactive to light  Comments: Fundi clear   Neck:      Musculoskeletal: Normal range of motion and neck supple  Thyroid: No thyromegaly  Cardiovascular:      Rate and Rhythm: Normal rate and regular rhythm  Pulses: Normal pulses  Heart sounds: Normal heart sounds  No murmur  Pulmonary:      Effort: Pulmonary effort is normal  No respiratory distress  Breath sounds: Normal breath sounds  No wheezing or rales  Abdominal:      General: Bowel sounds are normal  There is no distension  Palpations: Abdomen is soft  There is no mass  Tenderness: There is no abdominal tenderness  There is no guarding  Genitourinary:     Comments: Grover 5 female  Musculoskeletal: Normal range of motion  Comments: No scoliosis   Lymphadenopathy:      Cervical: No cervical adenopathy  Skin:     General: Skin is dry     Neurological:      Mental Status: She is alert and oriented to person, place, and time  Cranial Nerves: No cranial nerve deficit  Motor: No abnormal muscle tone  Deep Tendon Reflexes: Reflexes are normal and symmetric  Reflexes normal    Psychiatric:         Behavior: Behavior normal          Thought Content: Thought content normal          Judgment: Judgment normal            Assessment:     Well adolescent  1  Encounter for well adult exam without abnormal findings     2  BMI 23 0-23 9, adult     3  Negative depression screening          Plan:         1  Anticipatory guidance discussed  Specific topics reviewed: breast self-exam, drugs, ETOH, and tobacco, importance of varied diet, limit TV, media violence, minimize junk food, seat belts and sex; STD and pregnancy prevention  2  Development: appropriate for age    1  Immunizations today: none  Hesitation to all the recommended vaccinations (Influenza and HPV) along with the risk of not vaccinating was addressed  4  Follow-up visit in 1 year for next well child visit, or sooner as needed

## 2020-09-21 DIAGNOSIS — N92.0 MENORRHAGIA WITH REGULAR CYCLE: ICD-10-CM

## 2020-09-22 RX ORDER — NORETHINDRONE ACETATE AND ETHINYL ESTRADIOL AND FERROUS FUMARATE 1MG-20(21)
KIT ORAL
Qty: 28 TABLET | Refills: 1 | Status: SHIPPED | OUTPATIENT
Start: 2020-09-22 | End: 2020-11-16

## 2020-10-20 ENCOUNTER — OFFICE VISIT (OUTPATIENT)
Dept: PEDIATRICS CLINIC | Age: 20
End: 2020-10-20
Payer: COMMERCIAL

## 2020-10-20 VITALS
DIASTOLIC BLOOD PRESSURE: 80 MMHG | SYSTOLIC BLOOD PRESSURE: 118 MMHG | BODY MASS INDEX: 24.69 KG/M2 | TEMPERATURE: 98.6 F | WEIGHT: 160 LBS

## 2020-10-20 DIAGNOSIS — Z20.822 EXPOSURE TO COVID-19 VIRUS: ICD-10-CM

## 2020-10-20 DIAGNOSIS — L01.00 IMPETIGO ANY SITE: Primary | ICD-10-CM

## 2020-10-20 PROCEDURE — 99213 OFFICE O/P EST LOW 20 MIN: CPT | Performed by: PEDIATRICS

## 2020-10-20 RX ORDER — CEPHALEXIN 500 MG/1
500 CAPSULE ORAL EVERY 12 HOURS SCHEDULED
Qty: 20 CAPSULE | Refills: 0 | Status: SHIPPED | OUTPATIENT
Start: 2020-10-20 | End: 2020-10-30

## 2020-11-16 ENCOUNTER — OFFICE VISIT (OUTPATIENT)
Dept: PEDIATRICS CLINIC | Age: 20
End: 2020-11-16
Payer: COMMERCIAL

## 2020-11-16 VITALS
BODY MASS INDEX: 24.54 KG/M2 | SYSTOLIC BLOOD PRESSURE: 114 MMHG | WEIGHT: 159 LBS | TEMPERATURE: 98.2 F | DIASTOLIC BLOOD PRESSURE: 74 MMHG

## 2020-11-16 DIAGNOSIS — J01.00 ACUTE MAXILLARY SINUSITIS, RECURRENCE NOT SPECIFIED: ICD-10-CM

## 2020-11-16 DIAGNOSIS — N92.0 MENORRHAGIA WITH REGULAR CYCLE: ICD-10-CM

## 2020-11-16 DIAGNOSIS — H92.09 EARACHE: Primary | ICD-10-CM

## 2020-11-16 PROCEDURE — 99213 OFFICE O/P EST LOW 20 MIN: CPT | Performed by: PEDIATRICS

## 2020-11-16 PROCEDURE — 92567 TYMPANOMETRY: CPT | Performed by: PEDIATRICS

## 2020-11-16 RX ORDER — NORETHINDRONE ACETATE AND ETHINYL ESTRADIOL AND FERROUS FUMARATE 1MG-20(21)
KIT ORAL
Qty: 28 TABLET | Refills: 1 | Status: SHIPPED | OUTPATIENT
Start: 2020-11-16 | End: 2021-01-11

## 2020-11-16 RX ORDER — AMOXICILLIN 500 MG/1
500 TABLET, FILM COATED ORAL 2 TIMES DAILY
Qty: 28 TABLET | Refills: 0 | Status: SHIPPED | OUTPATIENT
Start: 2020-11-16 | End: 2020-11-30

## 2021-01-11 DIAGNOSIS — N92.0 MENORRHAGIA WITH REGULAR CYCLE: ICD-10-CM

## 2021-01-11 RX ORDER — NORETHINDRONE ACETATE AND ETHINYL ESTRADIOL AND FERROUS FUMARATE 1MG-20(21)
KIT ORAL
Qty: 28 TABLET | Refills: 1 | Status: SHIPPED | OUTPATIENT
Start: 2021-01-11 | End: 2021-03-08

## 2021-01-20 DIAGNOSIS — Z20.822 EXPOSURE TO COVID-19 VIRUS: ICD-10-CM

## 2021-01-20 PROCEDURE — U0005 INFEC AGEN DETEC AMPLI PROBE: HCPCS

## 2021-01-20 PROCEDURE — U0003 INFECTIOUS AGENT DETECTION BY NUCLEIC ACID (DNA OR RNA); SEVERE ACUTE RESPIRATORY SYNDROME CORONAVIRUS 2 (SARS-COV-2) (CORONAVIRUS DISEASE [COVID-19]), AMPLIFIED PROBE TECHNIQUE, MAKING USE OF HIGH THROUGHPUT TECHNOLOGIES AS DESCRIBED BY CMS-2020-01-R: HCPCS

## 2021-01-22 LAB — SARS-COV-2 RNA RESP QL NAA+PROBE: NEGATIVE

## 2021-03-08 DIAGNOSIS — N92.0 MENORRHAGIA WITH REGULAR CYCLE: ICD-10-CM

## 2021-03-08 RX ORDER — NORETHINDRONE ACETATE AND ETHINYL ESTRADIOL AND FERROUS FUMARATE 1MG-20(21)
KIT ORAL
Qty: 28 TABLET | Refills: 1 | Status: SHIPPED | OUTPATIENT
Start: 2021-03-08 | End: 2021-04-27 | Stop reason: SDUPTHER

## 2021-04-27 ENCOUNTER — ANNUAL EXAM (OUTPATIENT)
Dept: OBGYN CLINIC | Facility: CLINIC | Age: 21
End: 2021-04-27
Payer: COMMERCIAL

## 2021-04-27 VITALS
SYSTOLIC BLOOD PRESSURE: 120 MMHG | WEIGHT: 164 LBS | BODY MASS INDEX: 24.86 KG/M2 | HEIGHT: 68 IN | DIASTOLIC BLOOD PRESSURE: 76 MMHG

## 2021-04-27 DIAGNOSIS — Z11.3 SCREENING EXAMINATION FOR VENEREAL DISEASE: ICD-10-CM

## 2021-04-27 DIAGNOSIS — Z01.419 ROUTINE GYNECOLOGICAL EXAMINATION: Primary | ICD-10-CM

## 2021-04-27 DIAGNOSIS — N92.0 MENORRHAGIA WITH REGULAR CYCLE: ICD-10-CM

## 2021-04-27 PROCEDURE — S0612 ANNUAL GYNECOLOGICAL EXAMINA: HCPCS | Performed by: PHYSICIAN ASSISTANT

## 2021-04-27 RX ORDER — NORETHINDRONE ACETATE AND ETHINYL ESTRADIOL 1MG-20(21)
1 KIT ORAL DAILY
Qty: 90 TABLET | Refills: 3 | Status: SHIPPED | OUTPATIENT
Start: 2021-04-27 | End: 2022-04-04

## 2021-04-27 NOTE — ASSESSMENT & PLAN NOTE
Pap guidelines reviewed  Pap with reflex and STD cultures done today  Script for Dao-Hill 1/20 refilled to pharmacy  Return to office for annual or as needed

## 2021-04-27 NOTE — PROGRESS NOTES
Assessment/Plan   Problem List Items Addressed This Visit        Other    Routine gynecological examination - Primary     Pap guidelines reviewed  Pap with reflex and STD cultures done today  Script for Dao-[place]  refilled to pharmacy  Return to office for annual or as needed  Relevant Orders    IGP, CtNg, rfx Aptima HPV ASCU      Other Visit Diagnoses     Menorrhagia with regular cycle        Relevant Medications    norethindrone-ethinyl estradiol (Junel FE 1/20) 1-20 MG-MCG per tablet    Screening examination for venereal disease        Relevant Orders    IGP, CtNg, rfx Aptima HPV ASCU          Subjective:     Patient ID: Eileen Huang is a 23 y o  y o  female  HPI  22 yo seen for annual exam  Currently on Junel 1/20 tolerating well would like to continue  Denies bowel or bladder issues  Last pap: N/A  The following portions of the patient's history were reviewed and updated as appropriate:   She  has no past medical history on file  She   Patient Active Problem List    Diagnosis Date Noted    Routine gynecological examination 2021    Well woman exam 2020    Benign pigmented nevus 2013     She  has a past surgical history that includes Skin lesion excision (Right)  Her family history includes Diabetes in her brother; Hypertension in her father; Obesity in her mother  She  reports that she has never smoked  She has never used smokeless tobacco  She reports current alcohol use  She reports that she does not use drugs  Current Outpatient Medications   Medication Sig Dispense Refill    norethindrone-ethinyl estradiol (Junel FE 1/20) 1-20 MG-MCG per tablet Take 1 tablet by mouth daily 90 tablet 3     No current facility-administered medications for this visit  She has No Known Allergies       Menstrual History:  OB History        0    Para   0    Term   0       0    AB   0    Living   0       SAB   0    TAB   0    Ectopic   0    Multiple   0    Live Births   0                  Patient's last menstrual period was 04/06/2021 (approximate)  Review of Systems   Constitutional: Negative for fatigue, fever and unexpected weight change  HENT: Negative for dental problem and sinus pressure  Eyes: Negative for visual disturbance  Respiratory: Negative for cough, shortness of breath and wheezing  Cardiovascular: Negative for chest pain  Gastrointestinal: Negative for abdominal pain, blood in stool, constipation, diarrhea, nausea and vomiting  Endocrine: Negative for polydipsia  Genitourinary: Negative for difficulty urinating, dyspareunia, dysuria, frequency, hematuria, pelvic pain and urgency  Musculoskeletal: Negative for arthralgias and back pain  Neurological: Negative for dizziness, seizures, light-headedness and headaches  Psychiatric/Behavioral: Negative for suicidal ideas  The patient is not nervous/anxious  Objective:  Vitals:    04/27/21 1421   BP: 120/76   BP Location: Left arm   Patient Position: Sitting   Cuff Size: Standard   Weight: 74 4 kg (164 lb)   Height: 5' 7 5" (1 715 m)      Physical Exam  Constitutional:       Appearance: Normal appearance  She is well-developed  Genitourinary:      Vulva, urethra, bladder, vagina and uterus normal       No vulval condylomata, lesion, tenderness, ulcerations, Bartholin's cyst or rash noted  No signs of labial injury  No labial fusion  No inguinal adenopathy present in the right or left side  No urethral prolapse, pain, swelling, tenderness, caruncle, mass or diverticulum present  Bladder is not distended or tender  No signs of injury or lesions in the vagina  No vaginal discharge, erythema, tenderness or bleeding  No cervical motion tenderness, discharge or lesion  Uterus is not enlarged, tender, irregular or mobile  No uterine mass detected  No right or left adnexal mass present  Right adnexa not tender or full        Left adnexa not tender or full  HENT:      Head: Normocephalic and atraumatic  Neck:      Thyroid: No thyromegaly  Cardiovascular:      Rate and Rhythm: Normal rate and regular rhythm  Heart sounds: Normal heart sounds  No murmur  No friction rub  No gallop  Pulmonary:      Effort: Pulmonary effort is normal  No respiratory distress  Breath sounds: Normal breath sounds  No wheezing  Chest:      Breasts: Breasts are symmetrical          Right: Normal  No swelling, bleeding, inverted nipple, mass, nipple discharge, skin change or tenderness  Left: Normal  No swelling, bleeding, inverted nipple, mass, nipple discharge, skin change or tenderness  Abdominal:      General: There is no distension  Palpations: Abdomen is soft  There is no mass  Tenderness: There is no abdominal tenderness  There is no guarding or rebound  Hernia: No hernia is present  Lymphadenopathy:      Cervical: No cervical adenopathy  Upper Body:      Right upper body: No supraclavicular, axillary or pectoral adenopathy  Left upper body: No supraclavicular, axillary or pectoral adenopathy  Lower Body: No right inguinal adenopathy  No left inguinal adenopathy  Neurological:      Mental Status: She is alert and oriented to person, place, and time  Skin:     General: Skin is warm and dry     Psychiatric:         Behavior: Behavior normal

## 2021-04-30 LAB
C TRACH RRNA CVX QL NAA+PROBE: NEGATIVE
CYTOLOGIST CVX/VAG CYTO: NORMAL
DX ICD CODE: NORMAL
N GONORRHOEA RRNA CVX QL NAA+PROBE: NEGATIVE
OTHER STN SPEC: NORMAL
OTHER STN SPEC: NORMAL
PATH REPORT.FINAL DX SPEC: NORMAL
SL AMB NOTE:: NORMAL
SL AMB SPECIMEN ADEQUACY: NORMAL
SL AMB TEST METHODOLOGY: NORMAL

## 2021-06-10 DIAGNOSIS — Z02.83 ENCOUNTER FOR DRUG SCREENING: Primary | ICD-10-CM

## 2021-06-11 LAB
6MAM UR QL SCN: NEGATIVE NG/ML
AMPHETAMINES UR QL SCN: NEGATIVE NG/ML
BARBITURATES UR QL SCN: NEGATIVE NG/ML
BENZODIAZ UR QL: NEGATIVE NG/ML
BZE UR QL: NEGATIVE NG/ML
CANNABINOIDS UR QL SCN: NEGATIVE NG/ML
METHADONE UR QL SCN: NEGATIVE NG/ML
OPIATES UR QL SCN: NEGATIVE NG/ML
OXYCODONE+OXYMORPHONE UR QL SCN: NEGATIVE NG/ML

## 2021-06-21 ENCOUNTER — TELEPHONE (OUTPATIENT)
Dept: PEDIATRICS CLINIC | Age: 21
End: 2021-06-21

## 2021-06-21 DIAGNOSIS — Z02.83 ENCOUNTER FOR DRUG SCREENING: Primary | ICD-10-CM

## 2021-06-21 NOTE — TELEPHONE ENCOUNTER
Spoke to patient  Ordered PCP in computer- (blood sample only)  Informed Gaviota Murphy- she stated per paper work needs to be a urine specimen  Had to write up a lab lip  Pt aware-will  today

## 2021-06-24 LAB — PCP UR SCN-MCNC: NEGATIVE NG/ML

## 2021-10-21 ENCOUNTER — OFFICE VISIT (OUTPATIENT)
Dept: PEDIATRICS CLINIC | Age: 21
End: 2021-10-21
Payer: COMMERCIAL

## 2021-10-21 VITALS
TEMPERATURE: 98.7 F | DIASTOLIC BLOOD PRESSURE: 80 MMHG | BODY MASS INDEX: 26.23 KG/M2 | SYSTOLIC BLOOD PRESSURE: 118 MMHG | WEIGHT: 170 LBS

## 2021-10-21 DIAGNOSIS — J01.00 ACUTE MAXILLARY SINUSITIS, RECURRENCE NOT SPECIFIED: ICD-10-CM

## 2021-10-21 DIAGNOSIS — J02.0 STREP PHARYNGITIS: ICD-10-CM

## 2021-10-21 DIAGNOSIS — I88.9 CERVICAL ADENITIS: ICD-10-CM

## 2021-10-21 DIAGNOSIS — J02.9 SORE THROAT: Primary | ICD-10-CM

## 2021-10-21 LAB — S PYO AG THROAT QL: POSITIVE

## 2021-10-21 PROCEDURE — 87880 STREP A ASSAY W/OPTIC: CPT | Performed by: PEDIATRICS

## 2021-10-21 PROCEDURE — 99213 OFFICE O/P EST LOW 20 MIN: CPT | Performed by: PEDIATRICS

## 2021-10-21 RX ORDER — AMOXICILLIN 875 MG/1
875 TABLET, COATED ORAL 2 TIMES DAILY
Qty: 28 TABLET | Refills: 0 | Status: SHIPPED | OUTPATIENT
Start: 2021-10-21 | End: 2021-11-04

## 2022-02-21 ENCOUNTER — HOSPITAL ENCOUNTER (EMERGENCY)
Facility: HOSPITAL | Age: 22
Discharge: HOME | End: 2022-02-21
Attending: EMERGENCY MEDICINE | Admitting: EMERGENCY MEDICINE
Payer: COMMERCIAL

## 2022-02-21 ENCOUNTER — APPOINTMENT (EMERGENCY)
Dept: RADIOLOGY | Facility: HOSPITAL | Age: 22
End: 2022-02-21
Payer: COMMERCIAL

## 2022-02-21 VITALS
OXYGEN SATURATION: 100 % | HEART RATE: 80 BPM | TEMPERATURE: 98.7 F | SYSTOLIC BLOOD PRESSURE: 146 MMHG | BODY MASS INDEX: 27.18 KG/M2 | RESPIRATION RATE: 18 BRPM | DIASTOLIC BLOOD PRESSURE: 78 MMHG | WEIGHT: 176.15 LBS
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
## 2022-02-21 DIAGNOSIS — R07.9 CHEST PAIN: ICD-10-CM

## 2022-02-21 LAB
EXT PREG TEST URINE: NEGATIVE
EXT. CONTROL ED NAV: NORMAL

## 2022-02-21 PROCEDURE — 99284 EMERGENCY DEPT VISIT MOD MDM: CPT

## 2022-02-21 PROCEDURE — 99282 EMERGENCY DEPT VISIT SF MDM: CPT | Performed by: PHYSICIAN ASSISTANT

## 2022-02-21 PROCEDURE — 71045 X-RAY EXAM CHEST 1 VIEW: CPT

## 2022-02-21 PROCEDURE — 81025 URINE PREGNANCY TEST: CPT
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
## 2022-02-21 NOTE — Clinical Note
Usha Kasper was seen and treated in our emergency department on 2/21/2022  Diagnosis:     Jemima Dolan  may return to work on return date  She may return on this date: 02/22/2022         If you have any questions or concerns, please don't hesitate to call        Blanca SeeCHELSI    ______________________________           _______________          _______________  Hospital Representative                              Date                                Time

## 2022-03-31 ENCOUNTER — OFFICE VISIT (OUTPATIENT)
Dept: PEDIATRICS CLINIC | Age: 22
End: 2022-03-31
Payer: COMMERCIAL

## 2022-03-31 VITALS
TEMPERATURE: 98 F | WEIGHT: 179 LBS | HEART RATE: 80 BPM | HEIGHT: 68 IN | SYSTOLIC BLOOD PRESSURE: 120 MMHG | RESPIRATION RATE: 18 BRPM | DIASTOLIC BLOOD PRESSURE: 80 MMHG | BODY MASS INDEX: 27.13 KG/M2

## 2022-03-31 DIAGNOSIS — Z13.31 NEGATIVE DEPRESSION SCREENING: ICD-10-CM

## 2022-03-31 DIAGNOSIS — Z71.82 EXERCISE COUNSELING: ICD-10-CM

## 2022-03-31 DIAGNOSIS — R14.0 ABDOMINAL BLOATING: ICD-10-CM

## 2022-03-31 DIAGNOSIS — Z71.3 DIETARY COUNSELING: ICD-10-CM

## 2022-03-31 DIAGNOSIS — R63.5 WEIGHT GAIN, ABNORMAL: ICD-10-CM

## 2022-03-31 DIAGNOSIS — Z00.00 WELL ADULT EXAM: Primary | ICD-10-CM

## 2022-03-31 PROBLEM — J02.0 STREP PHARYNGITIS: Status: RESOLVED | Noted: 2021-10-21 | Resolved: 2022-03-31

## 2022-03-31 PROBLEM — J01.00 ACUTE MAXILLARY SINUSITIS: Status: RESOLVED | Noted: 2021-10-21 | Resolved: 2022-03-31

## 2022-03-31 PROCEDURE — 99173 VISUAL ACUITY SCREEN: CPT | Performed by: PEDIATRICS

## 2022-03-31 PROCEDURE — 99395 PREV VISIT EST AGE 18-39: CPT | Performed by: PEDIATRICS

## 2022-03-31 NOTE — PROGRESS NOTES
Subjective:     Jessa Ayala is a 24 y o  female who is brought in for this well child visit  History provided by: patient    Current Issues:  Current concerns: weight gain and abdominal bloating with most foods  regular periods, no issues    The following portions of the patient's history were reviewed and updated as appropriate:   She  has a past medical history of Acute maxillary sinusitis (10/21/2021) and Strep pharyngitis (10/21/2021)  She   Patient Active Problem List    Diagnosis Date Noted    Well adult exam 03/31/2022    Weight gain, abnormal 03/31/2022    Abdominal bloating 03/31/2022    Dietary counseling 03/31/2022    Exercise counseling 03/31/2022    Negative depression screening 03/31/2022    Cervical adenitis 10/21/2021    Routine gynecological examination 04/27/2021    Well woman exam 02/12/2020    Benign pigmented nevus 08/29/2013     She  has a past surgical history that includes Skin lesion excision (Right)  Her family history includes Diabetes in her brother; Hypertension in her father; Obesity in her mother  She  reports that she has never smoked  She has never used smokeless tobacco  She reports current alcohol use  She reports that she does not use drugs  Current Outpatient Medications   Medication Sig Dispense Refill    norethindrone-ethinyl estradiol (Junel FE 1/20) 1-20 MG-MCG per tablet Take 1 tablet by mouth daily 90 tablet 3     No current facility-administered medications for this visit  Current Outpatient Medications on File Prior to Visit   Medication Sig    norethindrone-ethinyl estradiol (Junel FE 1/20) 1-20 MG-MCG per tablet Take 1 tablet by mouth daily     No current facility-administered medications on file prior to visit  She has No Known Allergies       Well Child Assessment:  Praneeth Sanatna lives with her mother, father and brother  Interval problems do not include recent illness or recent injury     Nutrition  Types of intake include vegetables, fruits, meats, eggs, fish, cereals, juices and junk food  Junk food includes fast food and desserts (limited)  Dental  The patient has a dental home  The patient brushes teeth regularly  The patient flosses regularly  Last dental exam was less than 6 months ago  Elimination  Elimination problems do not include constipation, diarrhea or urinary symptoms  Sleep  Average sleep duration (hrs): 7-8  There are no sleep problems  Safety  There is no smoking in the home  Home has working smoke alarms? yes  Home has working carbon monoxide alarms? yes  There is no gun in home  School  Grade level in school: [employer]  Child is doing well in school  Social  The caregiver enjoys the child  Sibling interactions are good  Screen time per day: Over 2 hours          Review of Systems   Constitutional: Positive for unexpected weight change (increased)  Negative for chills and fever  HENT: Negative for ear pain and sore throat  Eyes: Negative for pain and visual disturbance  Respiratory: Negative for cough and shortness of breath  Cardiovascular: Negative for chest pain and palpitations  Gastrointestinal: Positive for abdominal distention  Negative for abdominal pain, constipation, diarrhea and vomiting  Genitourinary: Negative for dysuria and hematuria  Musculoskeletal: Negative for arthralgias and back pain  Skin: Negative for color change and rash  Neurological: Negative for seizures and syncope  Psychiatric/Behavioral: Negative for sleep disturbance  All other systems reviewed and are negative      PHQ-2/9 Depression Screening    Little interest or pleasure in doing things: 0 - not at all  Feeling down, depressed, or hopeless: 0 - not at all  Trouble falling or staying asleep, or sleeping too much: 0 - not at all  Feeling tired or having little energy: 0 - not at all  Poor appetite or overeatin - not at all  Feeling bad about yourself - or that you are a failure or have let yourself or your family down: 0 - not at all  Trouble concentrating on things, such as reading the newspaper or watching television: 0 - not at all  Moving or speaking so slowly that other people could have noticed  Or the opposite - being so fidgety or restless that you have been moving around a lot more than usual: 0 - not at all  Thoughts that you would be better off dead, or of hurting yourself in some way: 0 - not at all  PHQ-2 Score: 0  PHQ-2 Interpretation: Negative depression screen  PHQ-9 Score: 0   PHQ-9 Interpretation: No or Minimal depression           Objective:       Vitals:    03/31/22 1308   BP: 120/80   BP Location: Left arm   Patient Position: Sitting   Cuff Size: Standard   Pulse: 80   Resp: 18   Temp: 98 °F (36 7 °C)   TempSrc: Temporal   Weight: 81 2 kg (179 lb)   Height: 5' 7 75" (1 721 m)     Growth parameters are noted and are not appropriate for age  Wt Readings from Last 1 Encounters:   03/31/22 81 2 kg (179 lb)     Ht Readings from Last 1 Encounters:   03/31/22 5' 7 75" (1 721 m)      Body mass index is 27 42 kg/m²  Vitals:    03/31/22 1308   BP: 120/80   BP Location: Left arm   Patient Position: Sitting   Cuff Size: Standard   Pulse: 80   Resp: 18   Temp: 98 °F (36 7 °C)   TempSrc: Temporal   Weight: 81 2 kg (179 lb)   Height: 5' 7 75" (1 721 m)        Hearing Screening    Method: Otoacoustic emissions    125Hz 250Hz 500Hz 1000Hz 2000Hz 3000Hz 4000Hz 6000Hz 8000Hz   Right ear:     10 15 14     Left ear:     6 7 0     Comments: Bilateral pass  Right ear 5000 HZ - 15 DB Left ear 5000 HZ - 15 DB      Visual Acuity Screening    Right eye Left eye Both eyes   Without correction: 20/20 20/20 20/20   With correction:          Physical Exam  Vitals and nursing note reviewed  Exam conducted with a chaperone present Kaela Mejia CMA)  Constitutional:       General: She is not in acute distress  Appearance: Normal appearance  She is well-developed  She is not ill-appearing or toxic-appearing  HENT:      Head: Normocephalic and atraumatic  Right Ear: Tympanic membrane and external ear normal       Left Ear: Tympanic membrane and external ear normal       Nose: Nose normal       Mouth/Throat:      Mouth: Mucous membranes are moist       Pharynx: Oropharynx is clear  No oropharyngeal exudate or posterior oropharyngeal erythema  Eyes:      General:         Right eye: No discharge  Left eye: No discharge  Extraocular Movements: Extraocular movements intact  Conjunctiva/sclera: Conjunctivae normal       Pupils: Pupils are equal, round, and reactive to light  Comments: Fundi clear   Neck:      Thyroid: No thyromegaly  Cardiovascular:      Rate and Rhythm: Normal rate and regular rhythm  Heart sounds: Normal heart sounds  No murmur heard  Pulmonary:      Effort: Pulmonary effort is normal  No respiratory distress  Breath sounds: Normal breath sounds  No wheezing or rales  Abdominal:      General: Bowel sounds are normal  There is no distension  Palpations: Abdomen is soft  There is no mass  Tenderness: There is no abdominal tenderness  There is no right CVA tenderness, left CVA tenderness or guarding  Genitourinary:     Comments: Declined exam  Musculoskeletal:         General: Normal range of motion  Cervical back: Normal range of motion and neck supple  Comments: No vertebral asymmetry   Lymphadenopathy:      Cervical: No cervical adenopathy  Skin:     General: Skin is dry  Neurological:      Mental Status: She is alert and oriented to person, place, and time  Cranial Nerves: No cranial nerve deficit  Motor: No abnormal muscle tone  Deep Tendon Reflexes: Reflexes are normal and symmetric  Reflexes normal    Psychiatric:         Mood and Affect: Mood normal          Behavior: Behavior normal          Thought Content: Thought content normal          Judgment: Judgment normal            Assessment:     Well adolescent  1  Well adult exam  CBC and differential    Comprehensive metabolic panel    Lipid panel    CBC and differential    Comprehensive metabolic panel    Lipid panel   2  Weight gain, abnormal  CBC and differential    Comprehensive metabolic panel    Lipid panel    TSH + Free T4    Celiac Disease Panel    Sedimentation rate, automated    C-reactive protein    Allergen Profile, Basic Food    CBC and differential    Comprehensive metabolic panel    Lipid panel    TSH + Free T4    Celiac Disease Panel    Sedimentation rate, automated    C-reactive protein    Allergen Profile, Basic Food   3  Abdominal bloating  Celiac Disease Panel    Allergen Profile, Basic Food    Celiac Disease Panel    Allergen Profile, Basic Food   4  Dietary counseling     5  Exercise counseling     6  Negative depression screening          Plan:         1  Anticipatory guidance discussed  Specific topics reviewed: breast self-exam, drugs, ETOH, and tobacco, importance of regular exercise, importance of varied diet, limit TV, media violence, minimize junk food, seat belts and sex; STD and pregnancy prevention  BMI Counseling: Body mass index is 27 42 kg/m²  The BMI is above normal  Nutrition recommendations include encouraging healthy choices of fruits and vegetables, consuming healthier snacks and moderation in carbohydrate intake  Exercise recommendations include moderate physical activity 150 minutes/week  No pharmacotherapy was ordered  Rationale for BMI follow-up plan is due to patient being overweight or obese  Depression Screening and Follow-up Plan: Patient was screened for depression during today's encounter  They screened negative with a PHQ-2 score of 0         2  Development: appropriate for age    1  Immunizations today: Hesitation to all the recommended vaccinations (HPV) along with the risk of not vaccinating was addressed  4  Follow-up visit in 1 year for next well child visit, or sooner as needed

## 2022-04-19 LAB
ALBUMIN SERPL-MCNC: 4.8 G/DL (ref 3.9–5)
ALBUMIN/GLOB SERPL: 1.8 {RATIO} (ref 1.2–2.2)
ALP SERPL-CCNC: 61 IU/L (ref 44–121)
ALT SERPL-CCNC: 16 IU/L (ref 0–32)
AST SERPL-CCNC: 21 IU/L (ref 0–40)
BASOPHILS # BLD AUTO: 0 X10E3/UL (ref 0–0.2)
BASOPHILS NFR BLD AUTO: 1 %
BEEF IGE QN: <0.1 KU/L
BILIRUB SERPL-MCNC: 0.3 MG/DL (ref 0–1.2)
BUN SERPL-MCNC: 12 MG/DL (ref 6–20)
BUN/CREAT SERPL: 16 (ref 9–23)
CALCIUM SERPL-MCNC: 9.9 MG/DL (ref 8.7–10.2)
CHLORIDE SERPL-SCNC: 102 MMOL/L (ref 96–106)
CHOCOLATE IGE QN: <0.1 KU/L
CHOLEST SERPL-MCNC: 170 MG/DL (ref 100–199)
CHOLEST/HDLC SERPL: 2.8 RATIO (ref 0–4.4)
CO2 SERPL-SCNC: 22 MMOL/L (ref 20–29)
CORN IGE QN: <0.1 KU/L
COW MILK IGE QN: <0.1 KU/L
CREAT SERPL-MCNC: 0.73 MG/DL (ref 0.57–1)
CRP SERPL-MCNC: 9 MG/L (ref 0–10)
EGFR: 119 ML/MIN/1.73
ENDOMYSIUM IGA SER QL: NEGATIVE
EOSINOPHIL # BLD AUTO: 0.1 X10E3/UL (ref 0–0.4)
EOSINOPHIL NFR BLD AUTO: 1 %
ERYTHROCYTE [DISTWIDTH] IN BLOOD BY AUTOMATED COUNT: 11.5 % (ref 11.7–15.4)
ERYTHROCYTE [SEDIMENTATION RATE] IN BLOOD BY WESTERGREN METHOD: 13 MM/HR (ref 0–32)
FOOD ALLERG MIX2 IGE QL: NEGATIVE
GLOBULIN SER-MCNC: 2.6 G/DL (ref 1.5–4.5)
GLUCOSE SERPL-MCNC: 92 MG/DL (ref 65–99)
HCT VFR BLD AUTO: 43.1 % (ref 34–46.6)
HDLC SERPL-MCNC: 60 MG/DL
HGB BLD-MCNC: 15.2 G/DL (ref 11.1–15.9)
IGA SERPL-MCNC: 117 MG/DL (ref 87–352)
IMM GRANULOCYTES # BLD: 0 X10E3/UL (ref 0–0.1)
IMM GRANULOCYTES NFR BLD: 0 %
LDLC SERPL CALC-MCNC: 92 MG/DL (ref 0–99)
LYMPHOCYTES # BLD AUTO: 2 X10E3/UL (ref 0.7–3.1)
LYMPHOCYTES NFR BLD AUTO: 32 %
Lab: NORMAL
MCH RBC QN AUTO: 29.6 PG (ref 26.6–33)
MCHC RBC AUTO-ENTMCNC: 35.3 G/DL (ref 31.5–35.7)
MCV RBC AUTO: 84 FL (ref 79–97)
MONOCYTES # BLD AUTO: 0.5 X10E3/UL (ref 0.1–0.9)
MONOCYTES NFR BLD AUTO: 8 %
NEUTROPHILS # BLD AUTO: 3.5 X10E3/UL (ref 1.4–7)
NEUTROPHILS NFR BLD AUTO: 58 %
PEANUT IGE QN: <0.1 KU/L
PLATELET # BLD AUTO: 261 X10E3/UL (ref 150–450)
PORK IGE QN: <0.1 KU/L
POTASSIUM SERPL-SCNC: 4 MMOL/L (ref 3.5–5.2)
PROT SERPL-MCNC: 7.4 G/DL (ref 6–8.5)
RBC # BLD AUTO: 5.13 X10E6/UL (ref 3.77–5.28)
SL AMB VLDL CHOLESTEROL CALC: 18 MG/DL (ref 5–40)
SODIUM SERPL-SCNC: 139 MMOL/L (ref 134–144)
SOYBEAN IGE QN: <0.1 KU/L
T4 FREE SERPL DIALY-MCNC: 0.83 NG/DL
TRIGL SERPL-MCNC: 97 MG/DL (ref 0–149)
TSH SERPL-ACNC: 2.5 UU/ML
TTG IGA SER-ACNC: <2 U/ML (ref 0–3)
WBC # BLD AUTO: 6.1 X10E3/UL (ref 3.4–10.8)
WHEAT IGE QN: <0.1 KU/L
WHOLE EGG IGE QN: <0.1 KU/L

## 2022-04-20 ENCOUNTER — OFFICE VISIT (OUTPATIENT)
Dept: PEDIATRICS CLINIC | Age: 22
End: 2022-04-20
Payer: COMMERCIAL

## 2022-04-20 VITALS
DIASTOLIC BLOOD PRESSURE: 70 MMHG | BODY MASS INDEX: 26.81 KG/M2 | SYSTOLIC BLOOD PRESSURE: 118 MMHG | WEIGHT: 175 LBS | TEMPERATURE: 98.2 F

## 2022-04-20 DIAGNOSIS — J02.9 SORE THROAT: Primary | ICD-10-CM

## 2022-04-20 DIAGNOSIS — R52 GENERALIZED BODY ACHES: ICD-10-CM

## 2022-04-20 DIAGNOSIS — J01.00 ACUTE MAXILLARY SINUSITIS, RECURRENCE NOT SPECIFIED: ICD-10-CM

## 2022-04-20 DIAGNOSIS — R50.9 FEVER, UNSPECIFIED FEVER CAUSE: ICD-10-CM

## 2022-04-20 LAB
S PYO AG THROAT QL: NEGATIVE
SL AMB POCT RAPID FLU A: NORMAL
SL AMB POCT RAPID FLU B: NORMAL

## 2022-04-20 PROCEDURE — 87804 INFLUENZA ASSAY W/OPTIC: CPT | Performed by: PEDIATRICS

## 2022-04-20 PROCEDURE — 99213 OFFICE O/P EST LOW 20 MIN: CPT | Performed by: PEDIATRICS

## 2022-04-20 PROCEDURE — 87880 STREP A ASSAY W/OPTIC: CPT | Performed by: PEDIATRICS

## 2022-04-20 RX ORDER — AMOXICILLIN 875 MG/1
875 TABLET, COATED ORAL 2 TIMES DAILY
Qty: 28 TABLET | Refills: 0 | Status: SHIPPED | OUTPATIENT
Start: 2022-04-20 | End: 2022-05-04

## 2022-04-20 NOTE — PROGRESS NOTES
Assessment/Plan:   RAPID FLU A-NEG, B-NEG  RAPID STREP - NEG  RX AMOXIL  SHOULD IMPROVE  WITHIN 3  DAYS   Diagnoses and all orders for this visit:    Sore throat  -     POCT rapid strepA  -     POCT rapid flu A and B  -     Throat culture    Fever, unspecified fever cause  -     POCT rapid strepA  -     POCT rapid flu A and B  -     Throat culture  -     amoxicillin (AMOXIL) 875 mg tablet; Take 1 tablet (875 mg total) by mouth 2 (two) times a day for 14 days    Generalized body aches  -     POCT rapid flu A and B    Acute maxillary sinusitis, recurrence not specified  -     amoxicillin (AMOXIL) 875 mg tablet; Take 1 tablet (875 mg total) by mouth 2 (two) times a day for 14 days          Subjective:     Patient ID: Lesley Dominguez is a 24 y o  female  SICK  FOR FOR  2 DAYS ,  HAD  101  FEVER  THIS  AM  , C/O  SORE  THROAT SINCE  YESTERDAY, NO COUGH ,   NO  SICK  CONTACTS  AT  HOME         Review of Systems   Constitutional: Positive for appetite change and fever  Negative for activity change  HENT: Positive for congestion, ear pain, sore throat and voice change  Negative for rhinorrhea  Eyes: Negative for discharge and redness  Respiratory: Negative for cough  Gastrointestinal: Positive for abdominal pain  Negative for diarrhea, nausea and vomiting  Musculoskeletal: Positive for back pain, myalgias and neck pain  Negative for arthralgias  Skin: Negative for rash  Neurological: Positive for headaches  Psychiatric/Behavioral: Negative for sleep disturbance  Objective:     Physical Exam  Vitals reviewed  Constitutional:       General: She is not in acute distress  Appearance: Normal appearance  She is well-developed  HENT:      Right Ear: Tympanic membrane, ear canal and external ear normal       Left Ear: Tympanic membrane, ear canal and external ear normal       Nose: Nasal tenderness, mucosal edema and congestion present  No rhinorrhea        Right Sinus: Maxillary sinus tenderness present  Left Sinus: Maxillary sinus tenderness present  Mouth/Throat:      Mouth: Mucous membranes are moist       Pharynx: Posterior oropharyngeal erythema present  No oropharyngeal exudate  Eyes:      General:         Right eye: No discharge  Left eye: No discharge  Extraocular Movements: Extraocular movements intact  Conjunctiva/sclera: Conjunctivae normal    Neck:      Thyroid: No thyromegaly  Cardiovascular:      Rate and Rhythm: Normal rate and regular rhythm  Heart sounds: Normal heart sounds  No murmur heard  Pulmonary:      Effort: Pulmonary effort is normal  No respiratory distress  Breath sounds: Normal breath sounds  No wheezing or rales  Abdominal:      General: There is no distension  Palpations: Abdomen is soft  There is no mass  Tenderness: There is abdominal tenderness (MILD  EPIGASTRIC TENDERNESS)  There is no right CVA tenderness, left CVA tenderness or guarding  Musculoskeletal:         General: No tenderness  Normal range of motion  Cervical back: Normal range of motion and neck supple  Lymphadenopathy:      Cervical: No cervical adenopathy  Skin:     General: Skin is warm  Findings: No rash  Neurological:      General: No focal deficit present  Mental Status: She is alert     Psychiatric:         Mood and Affect: Mood normal          Behavior: Behavior normal

## 2022-04-23 LAB — B-HEM STREP SPEC QL CULT: NEGATIVE

## 2022-04-24 NOTE — RESULT ENCOUNTER NOTE
All labs are normal   Next step with the abdominal bloating would be for adult GI to exam Gaviota Murphy  Please refer

## 2022-04-27 ENCOUNTER — TELEPHONE (OUTPATIENT)
Dept: PEDIATRICS CLINIC | Age: 22
End: 2022-04-27

## 2022-05-02 DIAGNOSIS — N92.0 MENORRHAGIA WITH REGULAR CYCLE: ICD-10-CM

## 2022-05-03 RX ORDER — NORETHINDRONE ACETATE AND ETHINYL ESTRADIOL AND FERROUS FUMARATE 1MG-20(21)
KIT ORAL
Qty: 84 TABLET | Refills: 0 | Status: SHIPPED | OUTPATIENT
Start: 2022-05-03 | End: 2022-05-30

## 2022-06-29 ENCOUNTER — TELEPHONE (OUTPATIENT)
Dept: PEDIATRICS CLINIC | Age: 22
End: 2022-06-29

## 2022-06-29 DIAGNOSIS — B37.31 YEAST INFECTION OF THE VAGINA: Primary | ICD-10-CM

## 2022-06-29 RX ORDER — FLUCONAZOLE 150 MG/1
150 TABLET ORAL ONCE
Qty: 1 TABLET | Refills: 0 | Status: SHIPPED | OUTPATIENT
Start: 2022-06-29 | End: 2022-06-29

## 2022-09-19 DIAGNOSIS — N92.0 MENORRHAGIA WITH REGULAR CYCLE: ICD-10-CM

## 2022-09-19 RX ORDER — NORETHINDRONE ACETATE AND ETHINYL ESTRADIOL AND FERROUS FUMARATE 1MG-20(21)
KIT ORAL
Qty: 84 TABLET | Refills: 0 | Status: SHIPPED | OUTPATIENT
Start: 2022-09-19

## 2022-09-29 ENCOUNTER — ANNUAL EXAM (OUTPATIENT)
Dept: OBGYN CLINIC | Facility: CLINIC | Age: 22
End: 2022-09-29
Payer: COMMERCIAL

## 2022-09-29 VITALS
DIASTOLIC BLOOD PRESSURE: 76 MMHG | WEIGHT: 175 LBS | SYSTOLIC BLOOD PRESSURE: 120 MMHG | HEIGHT: 68 IN | BODY MASS INDEX: 26.52 KG/M2

## 2022-09-29 DIAGNOSIS — Z01.419 ROUTINE GYNECOLOGICAL EXAMINATION: Primary | ICD-10-CM

## 2022-09-29 DIAGNOSIS — R63.5 WEIGHT GAIN, ABNORMAL: ICD-10-CM

## 2022-09-29 PROCEDURE — 99395 PREV VISIT EST AGE 18-39: CPT | Performed by: PHYSICIAN ASSISTANT

## 2022-09-29 NOTE — PROGRESS NOTES
Assessment/Plan   Problem List Items Addressed This Visit        Other    Routine gynecological examination - Primary    Weight gain, abnormal          Discussion    All questions have been answered to her satisfaction  RTO for APE or sooner if needed  We reviewed option to change her to an alternative pill to see if her sx improve and she is able to lose the weight, although I am not convinced it is attributed to OCPs as opposed to changes in lifestyle habits  She is hesitant to have other sx on a different pill  Pt will consider options an call and let us know how she would like to proceed  Pap deferred due to guidelines  Subjective     HPI   Umang Mcgrath is a 20 y o  female who presents for annual well woman exam      Menarche - ; LMP -9/19/22 ; Periods are reg q 28 days and last a few  days; No excessive bleeding; No intermenstrual bleeding or spotting; Cramps are tolerable  Pt with complaints of weight gain on current pill  She was on a different generic originally, but has been on this exact pill x 2 years  We looked back and reviewed weight gain over the last 6 years  Py has gained approx 40 lbs, although lost 4 in the last few months  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[employer], 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  Recently having started grad school she feels her exercise has been much less frequent  We reviewed option to change her to an alternative pill to see if her sx improve and she is able to lose the weight, although I am not convinced it is attributed to OCPs as opposed to changes in lifestyle habits  She is hesitant to have other sx on a different pill  Pt will consider options an call and let us know how she would like to proceed  She did have recent TFTs through PCP that were normal      No vulvar itch/burn; No vaginal itch/burn; No abn discharge or odor;  No urinary sx - burning/pain/frequency/hematuria    (+) SBEs - no breast masses, asymmetry, nipple discharge or bleeding, changes in skin of breast, or breast tenderness bilaterally    No abd/pelvic pain or HAs;     Pt is sexually active in a mutually monog sexual relationship x 3 years; No issues with intercourse; She declines sti/hiv/hep testing; Feels safe at home    Current contraception: OCPs   Condom use:yes   Gardasil - declines at this point  (+) PCP for routine Bw/care; Last Pap - 21 WNL   History of abnormal Pap smear: denies     Review of Systems   Constitutional: Negative  Respiratory: Negative  Gastrointestinal: Negative  Endocrine: Negative  Genitourinary: Negative          The following portions of the patient's history were reviewed and updated as appropriate: allergies, current medications, past family history, past medical history, past social history, past surgical history and problem list          OB History        0    Para   0    Term   0       0    AB   0    Living   0       SAB   0    IAB   0    Ectopic   0    Multiple   0    Live Births   0                 Past Medical History:   Diagnosis Date    Acute maxillary sinusitis 10/21/2021    Strep pharyngitis 10/21/2021       Past Surgical History:   Procedure Laterality Date    SKIN LESION EXCISION Right     4th toe       Family History   Problem Relation Age of Onset    Obesity Mother     Hypertension Father     Diabetes Brother        Social History     Socioeconomic History    Marital status: Single     Spouse name: Not on file    Number of children: Not on file    Years of education: Not on file    Highest education level: Not on file   Occupational History    Not on file   Tobacco Use    Smoking status: Never Smoker    Smokeless tobacco: Never Used   Vaping Use    Vaping Use: Never used   Substance and Sexual Activity    Alcohol use: Yes     Comment: social     Drug use: No    Sexual activity: Yes     Partners: Male     Birth control/protection: Condom Male, OCP   Other Topics Concern    Not on file   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         Social Determinants of Health     Financial Resource Strain: Not on file   Food Insecurity: Not on file   Transportation Needs: Not on file   Physical Activity: Not on file   Stress: Not on file   Social Connections: Not on file   Intimate Partner Violence: Not on file   Housing Stability: Not on file         Current Outpatient Medications:     Junel FE 1/20 1-20 MG-MCG per tablet, TAKE ONE TABLET BY MOUTH EVERY DAY, Disp: 84 tablet, Rfl: 0    No Known Allergies    Objective   Vitals:    09/29/22 1317   BP: 120/76   BP Location: Right arm   Patient Position: Sitting   Weight: 79 4 kg (175 lb)   Height: 5' 7 75" (1 721 m)     Physical Exam  Constitutional:       Appearance: She is well-developed  HENT:      Head: Normocephalic and atraumatic  Cardiovascular:      Rate and Rhythm: Normal rate and regular rhythm  Pulmonary:      Effort: Pulmonary effort is normal       Breath sounds: Normal breath sounds  Chest:   Breasts: Breasts are symmetrical       Right: No inverted nipple, mass, nipple discharge, skin change or tenderness  Left: No inverted nipple, mass, nipple discharge, skin change or tenderness  Abdominal:      General: There is no distension  Palpations: Abdomen is soft  There is no mass  Tenderness: There is no guarding or rebound  Genitourinary:     Exam position: Supine  Labia:         Right: No rash  Left: No rash  Vagina: No signs of injury and foreign body  No vaginal discharge or erythema  Cervix: No cervical motion tenderness  Adnexa:         Right: No mass  Left: No mass  Rectum: Guaiac result negative  Skin:     General: Skin is warm and dry  Neurological:      Mental Status: She is alert and oriented to person, place, and time  There are no Patient Instructions on file for this visit

## 2022-10-11 PROBLEM — Z00.00 WELL ADULT EXAM: Status: RESOLVED | Noted: 2022-03-31 | Resolved: 2022-10-11

## 2022-10-12 PROBLEM — Z01.419 ROUTINE GYNECOLOGICAL EXAMINATION: Status: RESOLVED | Noted: 2021-04-27 | Resolved: 2022-10-12

## 2022-12-12 DIAGNOSIS — N92.0 MENORRHAGIA WITH REGULAR CYCLE: ICD-10-CM

## 2022-12-13 RX ORDER — NORETHINDRONE ACETATE AND ETHINYL ESTRADIOL AND FERROUS FUMARATE 1MG-20(21)
KIT ORAL
Qty: 84 TABLET | Refills: 0 | Status: SHIPPED | OUTPATIENT
Start: 2022-12-13

## 2023-01-09 DIAGNOSIS — N92.0 MENORRHAGIA WITH REGULAR CYCLE: ICD-10-CM

## 2023-01-09 RX ORDER — NORETHINDRONE ACETATE AND ETHINYL ESTRADIOL AND FERROUS FUMARATE 1MG-20(21)
KIT ORAL
Qty: 84 TABLET | Refills: 0 | Status: SHIPPED | OUTPATIENT
Start: 2023-01-09

## 2023-06-08 ENCOUNTER — TELEPHONE (OUTPATIENT)
Age: 23
End: 2023-06-08

## 2023-06-09 NOTE — TELEPHONE ENCOUNTER
06/09/23 7:51 AM        The office's request has been received, reviewed, and the patient chart updated  The PCP has successfully been removed with a patient attribution note  This message will now be completed          Thank you  Kemal Chavez

## 2023-06-26 DIAGNOSIS — N92.0 MENORRHAGIA WITH REGULAR CYCLE: ICD-10-CM

## 2023-06-27 RX ORDER — NORETHINDRONE ACETATE AND ETHINYL ESTRADIOL AND FERROUS FUMARATE 1MG-20(21)
KIT ORAL
Qty: 84 TABLET | Refills: 0 | Status: SHIPPED | OUTPATIENT
Start: 2023-06-27

## 2023-09-26 DIAGNOSIS — N92.0 MENORRHAGIA WITH REGULAR CYCLE: ICD-10-CM

## 2023-09-26 RX ORDER — NORETHINDRONE ACETATE AND ETHINYL ESTRADIOL 1MG-20(21)
1 KIT ORAL DAILY
Qty: 84 TABLET | Refills: 0 | Status: SHIPPED | OUTPATIENT
Start: 2023-09-26

## 2023-11-06 ENCOUNTER — OFFICE VISIT (OUTPATIENT)
Dept: URGENT CARE | Facility: CLINIC | Age: 23
End: 2023-11-06
Payer: COMMERCIAL

## 2023-11-06 VITALS
BODY MASS INDEX: 25.49 KG/M2 | DIASTOLIC BLOOD PRESSURE: 80 MMHG | RESPIRATION RATE: 17 BRPM | OXYGEN SATURATION: 99 % | HEART RATE: 76 BPM | WEIGHT: 162.4 LBS | TEMPERATURE: 99 F | SYSTOLIC BLOOD PRESSURE: 110 MMHG | HEIGHT: 67 IN

## 2023-11-06 DIAGNOSIS — J01.90 ACUTE NON-RECURRENT SINUSITIS, UNSPECIFIED LOCATION: Primary | ICD-10-CM

## 2023-11-06 PROCEDURE — 99203 OFFICE O/P NEW LOW 30 MIN: CPT | Performed by: PHYSICIAN ASSISTANT

## 2023-11-06 RX ORDER — DOXYCYCLINE 100 MG/1
100 CAPSULE ORAL 2 TIMES DAILY
Qty: 14 CAPSULE | Refills: 0 | Status: SHIPPED | OUTPATIENT
Start: 2023-11-06 | End: 2023-11-13

## 2023-11-06 NOTE — PROGRESS NOTES
[facility]        NAME: Natalie Yepez is a 24 y.o. female  : 2000    MRN: [id number]  DATE: 2023  TIME: 3:25 PM    Assessment and Plan   Acute non-recurrent sinusitis, unspecified location [J01.90]  1. Acute non-recurrent sinusitis, unspecified location  doxycycline monohydrate (MONODOX) 100 mg capsule        Cannot tolerate augmentin, will send doxy. Discussed possible s/es. Discussed strict return to care precautions as well as red flag symptoms which should prompt immediate ED referral. Pt verbalized understanding and is in agreement with plan. Please follow up with your primary care provider within the next week. Please remember that your visit today was with an urgent care provider and should not replace follow up with your primary care provider for chronic medical issues or annual physicals. Patient Instructions       Follow up with PCP in 3-5 days. Proceed to  ER if symptoms worsen. Chief Complaint     Chief Complaint   Patient presents with    Cold Like Symptoms     Nasal/sinus pressure, stuffy nose and loss of tastecand smell since Friday night. Tested negative for Covid this AM using the home test kit         History of Present Illness       Pt pw sinus pressure/pain, nasal congestion, headache, and decreased smell/taste x 2 days. Had URI symptoms of wet cough and nasal congestion 2 wks ago which did feel better after a week but never 100% went away. No fever, cp, sob, wheezing, ear pain. Has been taking tylenol sinus. Review of Systems   Review of Systems   Constitutional:  Positive for fatigue. Negative for chills, diaphoresis and fever. HENT:  Positive for congestion, sinus pressure and sinus pain. Negative for ear pain, postnasal drip, rhinorrhea, sneezing, sore throat and trouble swallowing. Eyes:  Negative for pain and redness. Respiratory:  Negative for cough, chest tightness, shortness of breath and wheezing.     Cardiovascular:  Negative for chest pain and leg swelling. Gastrointestinal:  Negative for diarrhea, nausea and vomiting. Neurological:  Negative for dizziness and weakness. Current Medications       Current Outpatient Medications:     doxycycline monohydrate (MONODOX) 100 mg capsule, Take 1 capsule (100 mg total) by mouth 2 (two) times a day for 7 days, Disp: 14 capsule, Rfl: 0    norethindrone-ethinyl estradiol (Junel FE 1/20) 1-20 MG-MCG per tablet, Take 1 tablet by mouth daily, Disp: 84 tablet, Rfl: 0    Current Allergies     Allergies as of 11/06/2023 - Reviewed 11/06/2023   Allergen Reaction Noted    Codeine Anaphylaxis 11/06/2023            The following portions of the patient's history were reviewed and updated as appropriate: allergies, current medications, past family history, past medical history, past social history, past surgical history and problem list.     Past Medical History:   Diagnosis Date    Acute maxillary sinusitis 10/21/2021    Strep pharyngitis 10/21/2021       Past Surgical History:   Procedure Laterality Date    SKIN LESION EXCISION Right     4th toe       Family History   Problem Relation Age of Onset    Obesity Mother     Hypertension Father     Diabetes Brother          Medications have been verified. Objective   /80   Pulse 76   Temp 99 °F (37.2 °C) (Tympanic)   Resp 17   Ht 5' 7" (1.702 m)   Wt 73.7 kg (162 lb 6.4 oz)   SpO2 99%   BMI 25.44 kg/m²        Physical Exam     Physical Exam  Vitals and nursing note reviewed. Constitutional:       General: She is not in acute distress. Appearance: Normal appearance. She is not toxic-appearing. HENT:      Head: Normocephalic and atraumatic. Right Ear: Tympanic membrane, ear canal and external ear normal.      Left Ear: Tympanic membrane, ear canal and external ear normal.      Nose: Congestion present. Mouth/Throat:      Mouth: Mucous membranes are moist.      Pharynx: Oropharynx is clear.  No oropharyngeal exudate or posterior oropharyngeal erythema. Eyes:      Conjunctiva/sclera: Conjunctivae normal.      Pupils: Pupils are equal, round, and reactive to light. Cardiovascular:      Rate and Rhythm: Normal rate and regular rhythm. Heart sounds: Normal heart sounds. Pulmonary:      Effort: Pulmonary effort is normal. No respiratory distress. Breath sounds: Normal breath sounds. No wheezing, rhonchi or rales. Musculoskeletal:      Cervical back: Normal range of motion and neck supple. Skin:     General: Skin is warm and dry. Capillary Refill: Capillary refill takes less than 2 seconds. Neurological:      Mental Status: She is alert and oriented to person, place, and time.    Psychiatric:         Behavior: Behavior normal.

## 2023-12-05 ENCOUNTER — OFFICE VISIT (OUTPATIENT)
Dept: URGENT CARE | Facility: CLINIC | Age: 23
End: 2023-12-05
Payer: COMMERCIAL

## 2023-12-05 VITALS
OXYGEN SATURATION: 100 % | RESPIRATION RATE: 20 BRPM | WEIGHT: 168 LBS | HEART RATE: 71 BPM | HEIGHT: 67 IN | TEMPERATURE: 97.8 F | BODY MASS INDEX: 26.37 KG/M2

## 2023-12-05 DIAGNOSIS — J03.90 ACUTE TONSILLITIS, UNSPECIFIED ETIOLOGY: Primary | ICD-10-CM

## 2023-12-05 LAB — S PYO AG THROAT QL: NEGATIVE

## 2023-12-05 PROCEDURE — 87880 STREP A ASSAY W/OPTIC: CPT | Performed by: PHYSICIAN ASSISTANT

## 2023-12-05 PROCEDURE — 99213 OFFICE O/P EST LOW 20 MIN: CPT | Performed by: PHYSICIAN ASSISTANT

## 2023-12-05 NOTE — PROGRESS NOTES
[facility]        NAME: Mara George is a 24 y.o. female  : 2000    MRN: [id number]  DATE: 2023  TIME: 3:57 PM    Assessment and Plan   Acute tonsillitis, unspecified etiology [J03.90]  1. Acute tonsillitis, unspecified etiology  POCT rapid strepA    Throat culture            Patient Instructions     1. Over-the-counter ibuprofen and/or acetaminophen as needed for pain or fever. 2. Gargle salt water as needed for sore throat pain relief. 3. Increase oral fluids. 4. Observe a liquid and/or soft diet until symptoms improve. 5. Go to the ER immediately for any worsening symptoms, inability to swallow, shortness of breath, or any other ominous symptoms. Chief Complaint     Chief Complaint   Patient presents with    Sore Throat     Swollen tonsil left side of throat with bloody exudate, pain and difficulty swallowing         History of Present Illness       69-year-old female patient with a 5-day history of left tonsillar pain, redness, swelling. She denies any fever or chills. Minimal congestion. No cough. Painful swallowing. Patient is concerned that may be strep. Sore Throat   Pertinent negatives include no abdominal pain, coughing, ear pain, shortness of breath or vomiting. Review of Systems   Review of Systems   Constitutional:  Negative for chills and fever. HENT:  Positive for sore throat. Negative for ear pain. Eyes:  Negative for pain and visual disturbance. Respiratory:  Negative for cough and shortness of breath. Cardiovascular:  Negative for chest pain and palpitations. Gastrointestinal:  Negative for abdominal pain and vomiting. Genitourinary:  Negative for dysuria and hematuria. Musculoskeletal:  Negative for arthralgias and back pain. Skin:  Negative for color change and rash. Neurological:  Negative for seizures and syncope. All other systems reviewed and are negative.         Current Medications       Current Outpatient Medications:     norethindrone-ethinyl estradiol (Junel FE 1/20) 1-20 MG-MCG per tablet, Take 1 tablet by mouth daily, Disp: 84 tablet, Rfl: 0    Current Allergies     Allergies as of 12/05/2023 - Reviewed 12/05/2023   Allergen Reaction Noted    Codeine Anaphylaxis 11/06/2023            The following portions of the patient's history were reviewed and updated as appropriate: allergies, current medications, past family history, past medical history, past social history, past surgical history and problem list.     Past Medical History:   Diagnosis Date    Acute maxillary sinusitis 10/21/2021    Strep pharyngitis 10/21/2021       Past Surgical History:   Procedure Laterality Date    SKIN LESION EXCISION Right     4th toe       Family History   Problem Relation Age of Onset    Obesity Mother     Hypertension Father     Diabetes Brother          Medications have been verified. Objective   Pulse 71   Temp 97.8 °F (36.6 °C)   Resp 20   Ht 5' 7" (1.702 m)   Wt 76.2 kg (168 lb)   LMP 11/14/2023   SpO2 100%   BMI 26.31 kg/m²        Physical Exam     Physical Exam  Constitutional:       General: She is not in acute distress. Appearance: Normal appearance. She is well-developed. She is not ill-appearing. HENT:      Head: Normocephalic. Nose: Nose normal. No congestion or rhinorrhea. Mouth/Throat:      Mouth: Mucous membranes are moist.      Pharynx: Pharyngeal swelling and posterior oropharyngeal erythema present. No oropharyngeal exudate. Tonsils: No tonsillar exudate or tonsillar abscesses. 1+ on the right. 2+ on the left. Eyes:      Conjunctiva/sclera: Conjunctivae normal.      Pupils: Pupils are equal, round, and reactive to light. Cardiovascular:      Rate and Rhythm: Normal rate and regular rhythm. Heart sounds: Normal heart sounds. Pulmonary:      Effort: Pulmonary effort is normal.   Musculoskeletal:      Cervical back: Normal range of motion.    Lymphadenopathy: Cervical: No cervical adenopathy. Skin:     General: Skin is warm and dry. Capillary Refill: Capillary refill takes less than 2 seconds. Neurological:      Mental Status: She is alert and oriented to person, place, and time.

## 2023-12-08 ENCOUNTER — TELEPHONE (OUTPATIENT)
Dept: URGENT CARE | Facility: CLINIC | Age: 23
End: 2023-12-08

## 2023-12-08 DIAGNOSIS — J02.0 STREP THROAT: Primary | ICD-10-CM

## 2023-12-08 LAB — B-HEM STREP SPEC QL CULT: ABNORMAL

## 2023-12-08 RX ORDER — PENICILLIN V POTASSIUM 500 MG/1
500 TABLET ORAL EVERY 8 HOURS SCHEDULED
Qty: 30 TABLET | Refills: 0 | Status: SHIPPED | OUTPATIENT
Start: 2023-12-08 | End: 2023-12-18

## 2023-12-08 NOTE — TELEPHONE ENCOUNTER
Patient returned my call and we spoke. Her symptoms remain persistent and unimproved. I offered her an antibiotic which she accepted as she is going to be going on vacation in a few days. She was instructed to return here in 7 to 10 days for any persistent symptoms and to follow-up sooner if worse. She thanked me for my call.

## 2023-12-08 NOTE — TELEPHONE ENCOUNTER
I called and left a voicemail for the patient regarding her throat culture being positive for nongroup a strep. I invited her to call back as I would like to hear how she is doing.

## 2023-12-19 ENCOUNTER — TELEPHONE (OUTPATIENT)
Dept: OBGYN CLINIC | Facility: CLINIC | Age: 23
End: 2023-12-19

## 2023-12-19 DIAGNOSIS — N92.0 MENORRHAGIA WITH REGULAR CYCLE: ICD-10-CM

## 2023-12-19 RX ORDER — NORETHINDRONE ACETATE AND ETHINYL ESTRADIOL 1MG-20(21)
1 KIT ORAL DAILY
Qty: 84 TABLET | Refills: 0 | Status: SHIPPED | OUTPATIENT
Start: 2023-12-19 | End: 2023-12-22 | Stop reason: SDUPTHER

## 2023-12-19 NOTE — TELEPHONE ENCOUNTER
Script sent to provider to sign. Called and confirmed pharmacy on file with patient and patient has no questions at this time.

## 2023-12-19 NOTE — TELEPHONE ENCOUNTER
Patient left message on machine - had appt with Marina scheduled in February and was told birth control prescription would go through to pharmacy, but they said it was denied because she needed an appt.

## 2023-12-22 DIAGNOSIS — N92.0 MENORRHAGIA WITH REGULAR CYCLE: ICD-10-CM

## 2023-12-22 RX ORDER — NORETHINDRONE ACETATE AND ETHINYL ESTRADIOL 1MG-20(21)
1 KIT ORAL DAILY
Qty: 84 TABLET | Refills: 0 | Status: SHIPPED | OUTPATIENT
Start: 2023-12-22

## 2024-02-21 ENCOUNTER — TELEPHONE (OUTPATIENT)
Dept: ADMINISTRATIVE | Facility: OTHER | Age: 24
End: 2024-02-21

## 2024-02-21 ENCOUNTER — OFFICE VISIT (OUTPATIENT)
Dept: FAMILY MEDICINE CLINIC | Facility: CLINIC | Age: 24
End: 2024-02-21
Payer: COMMERCIAL

## 2024-02-21 VITALS
TEMPERATURE: 98.2 F | BODY MASS INDEX: 26.97 KG/M2 | WEIGHT: 171.8 LBS | HEIGHT: 67 IN | SYSTOLIC BLOOD PRESSURE: 122 MMHG | DIASTOLIC BLOOD PRESSURE: 86 MMHG | OXYGEN SATURATION: 98 % | HEART RATE: 88 BPM | RESPIRATION RATE: 18 BRPM

## 2024-02-21 DIAGNOSIS — Z11.59 NEED FOR HEPATITIS C SCREENING TEST: ICD-10-CM

## 2024-02-21 DIAGNOSIS — Z85.820 HISTORY OF MELANOMA: ICD-10-CM

## 2024-02-21 DIAGNOSIS — Z00.00 PHYSICAL EXAM, ANNUAL: Primary | ICD-10-CM

## 2024-02-21 DIAGNOSIS — E55.9 VITAMIN D DEFICIENCY: ICD-10-CM

## 2024-02-21 DIAGNOSIS — Z11.4 SCREENING FOR HIV (HUMAN IMMUNODEFICIENCY VIRUS): ICD-10-CM

## 2024-02-21 PROCEDURE — 99385 PREV VISIT NEW AGE 18-39: CPT | Performed by: FAMILY MEDICINE

## 2024-02-21 NOTE — LETTER
Vaccination Request Form: COVID-19 aka SARS-CoV-2 (Moderna or Pfizer or J & J)      Date Requested: 24  Patient: Chanel Muñoz  Patient : 2000   Referring Provider: Bernie Nicolas DO       The above patient has informed us that they have had their   most recent COVID-19 aka SARS-CoV-2 (Moderna or Pfizer or J & J) administered at your facility. Please   complete this form and attach all corresponding documentation.    Date of Vaccine(s) Given  ______________________________    Lot Number(s) _______________________________________    Manufacture(s) ______________________________________    Dose Amount (s) _____________________________________    Expiration Date(s) ____________________________________    Comments __________________________________________________________  ____________________________________________________________________  ____________________________________________________________________  ____________________________________________________________________    Administering Facility  ________________________________________________    Vaccine Administered By (print name) ___________________________________      Form Completed By (print name) _______________________________________      Signature ___________________________________________________________      These reports are needed for  compliance.    Please fax this completed form and a copy of the Vaccine Document(s) to our office located at [address] [place] [address] as soon as possible to Fax [phone] attention Brinda: Phone [phone]    We thank you for your assistance in treating our mutual patient.

## 2024-02-21 NOTE — TELEPHONE ENCOUNTER
Upon review of the In Basket request and the patient's chart, initial outreach has been made via fax to facility. Please see Contacts section for details.     Thank you  Brinda Jang

## 2024-02-21 NOTE — LETTER
Vaccination Request Form: COVID-19 aka SARS-CoV-2 (Moderna or Pfizer or J & J)      Date Requested: 24  Patient: Chanel Muñoz  Patient : 2000   Referring Provider: Bernie Nicolas DO       The above patient has informed us that they have had their   most recent COVID-19 aka SARS-CoV-2 (Moderna or Pfizer or J & J) administered at your facility. Please   complete this form and attach all corresponding documentation.    Date of Vaccine(s) Given  ______________________________    Lot Number(s) _______________________________________    Manufacture(s) ______________________________________    Dose Amount (s) _____________________________________    Expiration Date(s) ____________________________________    Comments __________________________________________________________  ____________________________________________________________________  ____________________________________________________________________  ____________________________________________________________________    Administering Facility  ________________________________________________    Vaccine Administered By (print name) ___________________________________      Form Completed By (print name) _______________________________________      Signature ___________________________________________________________      These reports are needed for  compliance.    Please fax this completed form and a copy of the Vaccine Document(s) to our office located at [address] [place] [address] as soon as possible to Fax [phone] attention Brinda: Phone [phone]    We thank you for your assistance in treating our mutual patient.

## 2024-02-21 NOTE — TELEPHONE ENCOUNTER
----- Message from Oneida Pierre sent at 2/21/2024 10:10 AM EST -----  02/21/24 10:10 AM    Hello, our patient Chanel Muñoz has had the COVID vaccines completed/performed. Please assist in updating the patient chart by making an External outreach to [facility] facility located in [place].    Thank you,  Oneida Pierre PG  RENE

## 2024-02-22 NOTE — PROGRESS NOTES
"Name: Chanel Muñoz      : 2000      MRN: [id number]  Encounter Provider: Bernie Nicolas DO  Encounter Date: 2024   Encounter department: [facility]    Assessment & Plan     1. Physical exam, annual  Comments:  labs as ordered  healthy diet and exercise  otherwise preventively up to date  f/u 1 yr or prn  Orders:  -     Lipid panel; Future  -     Comprehensive metabolic panel; Future  -     CBC and differential; Future  -     TSH, 3rd generation; Future  -     Lipid panel  -     Comprehensive metabolic panel  -     CBC and differential  -     TSH, 3rd generation    2. Need for hepatitis C screening test  -     Hepatitis C Antibody; Future  -     Hepatitis C Antibody    3. Screening for HIV (human immunodeficiency virus)  -     HIV 1/2 AG/AB w Reflex [facility] for 2 yr old and above; Future    4. History of melanoma  -     Ambulatory Referral to Dermatology; Future    5. Vitamin D deficiency  -     Vitamin D 25 hydroxy; Future  -     Vitamin D 25 hydroxy           Subjective      HPI  Pt presents as new pt for physical/establishment.  Only medical hx of multiple melanomas as a young teen.  Hasn't been to derm in years.  Has no moles/lesions.  Needs to establish.  Due for labs.  Up to date on imm's.  Exercises regularly.  Sees dental.  Sees gyn    Review of Systems    Current Outpatient Medications on File Prior to Visit   Medication Sig    norethindrone-ethinyl estradiol (Junel FE 1/20) 1-20 MG-MCG per tablet Take 1 tablet by mouth daily       Objective     /86   Pulse 88   Temp 98.2 °F (36.8 °C) (Temporal)   Resp 18   Ht 5' 7\" (1.702 m)   Wt 77.9 kg (171 lb 12.8 oz)   SpO2 98%   BMI 26.91 kg/m²     Physical Exam  Bernie Nicolas DO    "

## 2024-02-26 ENCOUNTER — ANNUAL EXAM (OUTPATIENT)
Dept: OBGYN CLINIC | Facility: CLINIC | Age: 24
End: 2024-02-26
Payer: COMMERCIAL

## 2024-02-26 VITALS
WEIGHT: 171 LBS | BODY MASS INDEX: 26.84 KG/M2 | HEIGHT: 67 IN | DIASTOLIC BLOOD PRESSURE: 72 MMHG | SYSTOLIC BLOOD PRESSURE: 124 MMHG

## 2024-02-26 DIAGNOSIS — Z01.419 WOMEN'S ANNUAL ROUTINE GYNECOLOGICAL EXAMINATION: Primary | ICD-10-CM

## 2024-02-26 DIAGNOSIS — N92.0 MENORRHAGIA WITH REGULAR CYCLE: ICD-10-CM

## 2024-02-26 PROCEDURE — S0612 ANNUAL GYNECOLOGICAL EXAMINA: HCPCS | Performed by: PHYSICIAN ASSISTANT

## 2024-02-26 RX ORDER — NORETHINDRONE ACETATE AND ETHINYL ESTRADIOL 1MG-20(21)
1 KIT ORAL DAILY
Qty: 84 TABLET | Refills: 3 | Status: SHIPPED | OUTPATIENT
Start: 2024-02-26

## 2024-02-26 NOTE — PROGRESS NOTES
Assessment/Plan   Problem List Items Addressed This Visit    None  Visit Diagnoses       Women's annual routine gynecological examination    -  Primary    Relevant Orders    IGP, rfx Aptima HPV ASCU    Menorrhagia with regular cycle        Relevant Medications    norethindrone-ethinyl estradiol (Junel FE 1/20) 1-20 MG-MCG per tablet            Discussion    All questions have been answered to her satisfaction  RTO for APE or sooner if needed  Pap done.  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         Subjective     HPI   Chanel Muñoz is a 23 y.o. female who presents for annual well woman exam.     LMP - 24 ; Periods are reg q 28 days and last 4-5 days; No excessive bleeding; No intermenstrual bleeding or spotting; Cramps are tolerable.  No vulvar itch/burn; No vaginal itch/burn; No abn discharge or odor; No urinary sx - burning/pain/frequency/hematuria    No concerning breast masses, asymmetry, nipple discharge or bleeding, changes in skin of breast, or breast tenderness bilaterally    No abd/pelvic pain or HAs;     No menopausal symptoms.    Pt not sexually active in the last 8 mths. No issues with intercourse; She declines sti/hiv/hep testing; Feels safe at home  Current contraception: OCPs    (+) PCP for routine Bw/care;    Last Pap : 2021  History of abnormal Pap smear: denies       Review of Systems   Constitutional: Negative.    Respiratory: Negative.     Gastrointestinal: Negative.    Endocrine: Negative.    Genitourinary: Negative.        The following portions of the patient's history were reviewed and updated as appropriate: allergies, current medications, past family history, past medical history, past social history, past surgical history, and problem list.         OB History          0    Para   0    Term   0       0    AB   0    Living   0         SAB   0    IAB   0     Ectopic   0    Multiple   0    Live Births   0                 Past Medical History:   Diagnosis Date    Melanoma (HCC)     toe, elbow chest       Past Surgical History:   Procedure Laterality Date    SKIN LESION EXCISION Right     4th toe       Family History   Problem Relation Age of Onset    Obesity Mother     Hypertension Father     Melanoma Father     Diabetes Brother         Type 1       Social History     Socioeconomic History    Marital status: Single     Spouse name: Not on file    Number of children: Not on file    Years of education: Not on file    Highest education level: Not on file   Occupational History    Not on file   Tobacco Use    Smoking status: Never    Smokeless tobacco: Never   Vaping Use    Vaping status: Never Used   Substance and Sexual Activity    Alcohol use: Yes     Alcohol/week: 2.0 standard drinks of alcohol     Comment: social     Drug use: No    Sexual activity: Yes     Partners: Male     Birth control/protection: Condom Male, OCP   Other Topics Concern    Not on file   Social History Narrative    Completing masters in atheletic training     Exercises regularly     Social Determinants of Health     Financial Resource Strain: Low Risk  (2/21/2024)    Overall Financial Resource Strain (CARDIA)     Difficulty of Paying Living Expenses: Not hard at all   Food Insecurity: No Food Insecurity (2/21/2024)    Hunger Vital Sign     Worried About Running Out of Food in the Last Year: Never true     Ran Out of Food in the Last Year: Never true   Transportation Needs: No Transportation Needs (2/21/2024)    PRAPARE - Transportation     Lack of Transportation (Medical): No     Lack of Transportation (Non-Medical): No   Physical Activity: Sufficiently Active (10/20/2020)    Exercise Vital Sign     Days of Exercise per Week: 4 days     Minutes of Exercise per Session: 90 min   Stress: Not on file   Social Connections: Not on file   Intimate Partner Violence: Not on file   Housing Stability: Low  "Risk  (2/21/2024)    Housing Stability Vital Sign     Unable to Pay for Housing in the Last Year: No     Number of Places Lived in the Last Year: 1     Unstable Housing in the Last Year: No         Current Outpatient Medications:     norethindrone-ethinyl estradiol (Junel FE 1/20) 1-20 MG-MCG per tablet, Take 1 tablet by mouth daily, Disp: 84 tablet, Rfl: 3    Allergies   Allergen Reactions    Codeine Anaphylaxis     Childhood allergy.       Objective   Vitals:    02/26/24 0751   BP: 124/72   BP Location: Left arm   Patient Position: Sitting   Cuff Size: Standard   Weight: 77.6 kg (171 lb)   Height: 5' 7\" (1.702 m)     Physical Exam  Vitals reviewed.   HENT:      Head: Normocephalic and atraumatic.   Cardiovascular:      Rate and Rhythm: Normal rate and regular rhythm.   Pulmonary:      Effort: Pulmonary effort is normal.      Breath sounds: Normal breath sounds.   Chest:   Breasts:     Breasts are symmetrical.      Right: No swelling, bleeding, inverted nipple, mass, nipple discharge, skin change or tenderness.      Left: No swelling, bleeding, inverted nipple, mass, nipple discharge, skin change or tenderness.   Abdominal:      General: Abdomen is flat. Bowel sounds are normal.      Palpations: Abdomen is soft.      Tenderness: There is no abdominal tenderness. There is no right CVA tenderness, left CVA tenderness or guarding.   Genitourinary:     General: Normal vulva.      Pubic Area: No rash.       Labia:         Right: No rash, tenderness, lesion or injury.         Left: No rash, tenderness, lesion or injury.       Urethra: No prolapse, urethral pain, urethral swelling or urethral lesion.      Vagina: Normal. No signs of injury and foreign body. No vaginal discharge or erythema.      Cervix: Normal.      Uterus: Normal.       Adnexa: Right adnexa normal and left adnexa normal.   Musculoskeletal:      Cervical back: Neck supple.   Lymphadenopathy:      Upper Body:      Right upper body: No axillary adenopathy. "      Left upper body: No axillary adenopathy.   Skin:     General: Skin is warm and dry.   Neurological:      Mental Status: She is alert and oriented to person, place, and time.   Psychiatric:         Mood and Affect: Mood normal.         Behavior: Behavior normal.         Thought Content: Thought content normal.         Judgment: Judgment normal.         There are no Patient Instructions on file for this visit.

## 2024-02-27 NOTE — TELEPHONE ENCOUNTER
As a follow-up, a second attempt has been made for outreach via fax to facility. Please see Contacts section for details.    Thank you  Brinda Jang

## 2024-02-27 NOTE — TELEPHONE ENCOUNTER
Upon review of the In Basket request we were able to locate, review, and update the patient chart as requested for Immunization(s) COVID.    Any additional questions or concerns should be emailed to the Practice Liaisons via the appropriate education email address, please do not reply via In Basket.    Thank you  Brinda Jang

## 2024-02-28 LAB
CYTOLOGIST CVX/VAG CYTO: NORMAL
DX ICD CODE: NORMAL
OTHER STN SPEC: NORMAL
OTHER STN SPEC: NORMAL
PATH REPORT.FINAL DX SPEC: NORMAL
SL AMB NOTE:: NORMAL
SL AMB SPECIMEN ADEQUACY: NORMAL
SL AMB TEST METHODOLOGY: NORMAL

## 2024-03-08 LAB
25(OH)D3+25(OH)D2 SERPL-MCNC: 50.9 NG/ML (ref 30–100)
ALBUMIN SERPL-MCNC: 4.6 G/DL (ref 4–5)
ALBUMIN/GLOB SERPL: 1.7 {RATIO} (ref 1.2–2.2)
ALP SERPL-CCNC: 60 IU/L (ref 44–121)
ALT SERPL-CCNC: 11 IU/L (ref 0–32)
AST SERPL-CCNC: 15 IU/L (ref 0–40)
BASOPHILS # BLD AUTO: 0.1 X10E3/UL (ref 0–0.2)
BASOPHILS NFR BLD AUTO: 1 %
BILIRUB SERPL-MCNC: 0.4 MG/DL (ref 0–1.2)
BUN SERPL-MCNC: 9 MG/DL (ref 6–20)
BUN/CREAT SERPL: 11 (ref 9–23)
CALCIUM SERPL-MCNC: 10 MG/DL (ref 8.7–10.2)
CHLORIDE SERPL-SCNC: 105 MMOL/L (ref 96–106)
CHOLEST SERPL-MCNC: 199 MG/DL (ref 100–199)
CHOLEST/HDLC SERPL: 2.7 RATIO (ref 0–4.4)
CO2 SERPL-SCNC: 21 MMOL/L (ref 20–29)
CREAT SERPL-MCNC: 0.81 MG/DL (ref 0.57–1)
EGFR: 104 ML/MIN/1.73
EOSINOPHIL # BLD AUTO: 0.1 X10E3/UL (ref 0–0.4)
EOSINOPHIL NFR BLD AUTO: 1 %
ERYTHROCYTE [DISTWIDTH] IN BLOOD BY AUTOMATED COUNT: 11.8 % (ref 11.7–15.4)
GLOBULIN SER-MCNC: 2.7 G/DL (ref 1.5–4.5)
GLUCOSE SERPL-MCNC: 88 MG/DL (ref 70–99)
HCT VFR BLD AUTO: 44.4 % (ref 34–46.6)
HCV AB S/CO SERPL IA: NON REACTIVE
HDLC SERPL-MCNC: 74 MG/DL
HGB BLD-MCNC: 15.1 G/DL (ref 11.1–15.9)
HIV 1+2 AB+HIV1 P24 AG SERPL QL IA: NON REACTIVE
IMM GRANULOCYTES # BLD: 0 X10E3/UL (ref 0–0.1)
IMM GRANULOCYTES NFR BLD: 0 %
LDLC SERPL CALC-MCNC: 103 MG/DL (ref 0–99)
LYMPHOCYTES # BLD AUTO: 2.5 X10E3/UL (ref 0.7–3.1)
LYMPHOCYTES NFR BLD AUTO: 35 %
MCH RBC QN AUTO: 29.3 PG (ref 26.6–33)
MCHC RBC AUTO-ENTMCNC: 34 G/DL (ref 31.5–35.7)
MCV RBC AUTO: 86 FL (ref 79–97)
MONOCYTES # BLD AUTO: 0.5 X10E3/UL (ref 0.1–0.9)
MONOCYTES NFR BLD AUTO: 7 %
NEUTROPHILS # BLD AUTO: 4.1 X10E3/UL (ref 1.4–7)
NEUTROPHILS NFR BLD AUTO: 56 %
PLATELET # BLD AUTO: 254 X10E3/UL (ref 150–450)
POTASSIUM SERPL-SCNC: 4.2 MMOL/L (ref 3.5–5.2)
PROT SERPL-MCNC: 7.3 G/DL (ref 6–8.5)
RBC # BLD AUTO: 5.16 X10E6/UL (ref 3.77–5.28)
SL AMB VLDL CHOLESTEROL CALC: 22 MG/DL (ref 5–40)
SODIUM SERPL-SCNC: 141 MMOL/L (ref 134–144)
TRIGL SERPL-MCNC: 128 MG/DL (ref 0–149)
TSH SERPL DL<=0.005 MIU/L-ACNC: 1.93 UIU/ML (ref 0.45–4.5)
WBC # BLD AUTO: 7.2 X10E3/UL (ref 3.4–10.8)

## 2024-03-13 ENCOUNTER — OFFICE VISIT (OUTPATIENT)
Dept: DERMATOLOGY | Facility: CLINIC | Age: 24
End: 2024-03-13
Payer: COMMERCIAL

## 2024-03-13 VITALS — TEMPERATURE: 97.8 F | WEIGHT: 165 LBS | HEIGHT: 67 IN | BODY MASS INDEX: 25.9 KG/M2

## 2024-03-13 DIAGNOSIS — D22.9 MULTIPLE MELANOCYTIC NEVI: ICD-10-CM

## 2024-03-13 DIAGNOSIS — L81.4 LENTIGO: ICD-10-CM

## 2024-03-13 DIAGNOSIS — Z12.83 SKIN CANCER SCREENING: Primary | ICD-10-CM

## 2024-03-13 DIAGNOSIS — Z85.820 HISTORY OF MELANOMA: ICD-10-CM

## 2024-03-13 DIAGNOSIS — D22.9 SPITZ NEVUS: ICD-10-CM

## 2024-03-13 DIAGNOSIS — D18.01 CHERRY ANGIOMA: ICD-10-CM

## 2024-03-13 PROCEDURE — 99203 OFFICE O/P NEW LOW 30 MIN: CPT | Performed by: STUDENT IN AN ORGANIZED HEALTH CARE EDUCATION/TRAINING PROGRAM

## 2024-03-13 NOTE — PROGRESS NOTES
"[facility] Note     Patient Name: Chanel Muñoz  Encounter Date: 03/13/2024     Have you been cared for by a [facility] Dermatologist in the last 3 years and, if so, which description applies to you?    NO.   I am considered a \"new\" patient and must complete all patient intake questions. I am FEMALE/of child-bearing potential.    REVIEW OF SYSTEMS:  Have you recently had or currently have any of the following? Recent fever or chills? No  Any non-healing wound? No  Are you pregnant or planning to become pregnant? No  Are you currently or planning to be nursing or breast feeding? No   PAST MEDICAL HISTORY:  Have you personally ever had or currently have any of the following?  If \"YES,\" then please provide more detail. Skin cancer (such as Melanoma, Basal Cell Carcinoma, Squamous Cell Carcinoma?  YES, Spitz nevus  Tuberculosis, HIV/AIDS, Hepatitis B or C: No  Radiation Treatment No   HISTORY OF IMMUNOSUPPRESSION:   Do you have a history of any of the following:  Systemic Immunosuppression such as Diabetes, Biologic or Immunotherapy, Chemotherapy, Organ Transplantation, Bone Marrow Transplantation?  No    Answering \"YES\" requires the addition of the dotphrase \"IMMUNOSUPPRESSED\" as the first diagnosis of the patient's visit.   FAMILY HISTORY:  Any \"first degree relatives\" (parent, brother, sister, or child) with the following?    Skin Cancer, Pancreatic or Other Cancer? YES, Spitz nevus for mother and father   PATIENT EXPERIENCE:    Do you want the Dermatologist to perform a COMPLETE skin exam today including a clinical examination under the \"bra and underwear\" areas?  Yes  If necessary, do we have your permission to call and leave a detailed message on your Preferred Phone number that includes your specific medical information?  Yes      Allergies   Allergen Reactions    Codeine Anaphylaxis     Childhood allergy.      Current Outpatient Medications:     norethindrone-ethinyl estradiol (Junel FE 1/20) " 1-20 MG-MCG per tablet, Take 1 tablet by mouth daily, Disp: 84 tablet, Rfl: 3          Whom besides the patient is providing clinical information about today's encounter?   NO ADDITIONAL HISTORIAN (patient alone provided history)    Physical Exam and Assessment/Plan by Diagnosis:      HISTORY OF SPITZ NEVUS-- ALL COMPLETELY EXCISED IN PAST    Physical Exam:  Anatomic Location:  multiple locations (path reports scanned in)  No residual lesions   Pertinent Positives:  Pain on palpation? No  Active bleeding? No  Itching? No  Lesion growing? No  Pertinent Negatives:    Additional History of Present Condition:  Patient has a history of multiple spitz nevi that have been biopsied and removed. Started around June of 2013. Mother and father also have a history of spitz nevi. Patients charts scanned into chart today from previous dermatology offices.     Plan:  We discussed that in children under 12 years old, a spitz nevus will be monitored and the course of the lesion is largely self-limited. When a Spitz nevus first appears, it will typically grow for a few months and then will stop growing and stay the same size, shape, and color. After a few years, spitz nevi sometimes get smaller, flatter, or even disappear. Because of the difficulty in definitively predicting the outcome of a spitz nevus in older children or adults, they are generally excised.  We discussed sun protection measures to help prevent changes in the nevus. Protect the skin when outdoors by wearing hats, long sleeves and pants when possible. When possible, avoid mid-day sun and seek shade. Use sunscreen on exposed skin, and reapply often to prevent sunburns and skin damage.   The rare entity, agminated spitz nevus, presents as multiple spitz naevi in a segmental presentation. It is an example of cutaneous mosaicism and is due to a genetic mutation.  No new spitz nevi present on today's exam  Follow up in 6 months      PROCEDURES PERFORMED TODAY ASSOCIATED  "WITH THIS CONDITION:          NONE           MELANOCYTIC NEVI (\"Moles\")    Physical Exam:  Anatomic Location Affected:   Numerous, scattered across body  Morphological Description:  Scattered, 1-4mm round to ovoid, symmetrical-appearing, even bordered, skin colored to dark brown macules/papules, mostly in sun-exposed areas  Pertinent Positives:  Pertinent Negatives:    Additional History of Present Condition:      Assessment and Plan:  Based on a thorough discussion of this condition and the management approach to it (including a comprehensive discussion of the known risks, side effects and potential benefits of treatment), the patient (family) agrees to implement the following specific plan:  When outside we recommend using a wide brim hat, sunglasses, long sleeve and pants, sunscreen with SPF 30+ with reapplication every 2 hours, or SPF specific clothing   Benign, reassured  Annual skin check     Melanocytic Nevi  Melanocytic nevi (\"moles\") are tan or brown, raised or flat areas of the skin which have an increased number of melanocytes. Melanocytes are the cells in our body which make pigment and account for skin color.    Some moles are present at birth (I.e., \"congenital nevi\"), while others come up later in life (i.e., \"acquired nevi\").  The sun can stimulate the body to make more moles.  Sunburns are not the only thing that triggers more moles.  Chronic sun exposure can do it too.     Clinically distinguishing a healthy mole from melanoma may be difficult, even for experienced dermatologists. The \"ABCDE's\" of moles have been suggested as a means of helping to alert a person to a suspicious mole and the possible increased risk of melanoma.  The suggestions for raising alert are as follows:    Asymmetry: Healthy moles tend to be symmetric, while melanomas are often asymmetric.  Asymmetry means if you draw a line through the mole, the two halves do not match in color, size, shape, or surface texture. Asymmetry " "can be a result of rapid enlargement of a mole, the development of a raised area on a previously flat lesion, scaling, ulceration, bleeding or scabbing within the mole.  Any mole that starts to demonstrate \"asymmetry\" should be examined promptly by a board certified dermatologist.     Border: Healthy moles tend to have discrete, even borders.  The border of a melanoma often blends into the normal skin and does not sharply delineate the mole from normal skin.  Any mole that starts to demonstrate \"uneven borders\" should be examined promptly by a board certified dermatologist.     Color: Healthy moles tend to be one color throughout.  Melanomas tend to be made up of different colors ranging from dark black, blue, white, or red.  Any mole that demonstrates a color change should be examined promptly by a board certified dermatologist.     Diameter: Healthy moles tend to be smaller than 0.6 cm in size; an exception are \"congenital nevi\" that can be larger.  Melanomas tend to grow and can often be greater than 0.6 cm (1/4 of an inch, or the size of a pencil eraser). This is only a guideline, and many normal moles may be larger than 0.6 cm without being unhealthy.  Any mole that starts to change in size (small to bigger or bigger to smaller) should be examined promptly by a board certified dermatologist.     Evolving: Healthy moles tend to \"stay the same.\"  Melanomas may often show signs of change or evolution such as a change in size, shape, color, or elevation.  Any mole that starts to itch, bleed, crust, burn, hurt, or ulcerate or demonstrate a change or evolution should be examined promptly by a board certified dermatologist.        LENTIGO    Physical Exam:  Anatomic Location Affected:  trunk, arms  Morphological Description:  Light brown macules  Pertinent Positives:  Pertinent Negatives:    Additional History of Present Condition:      Assessment and Plan:  Based on a thorough discussion of this condition and the " management approach to it (including a comprehensive discussion of the known risks, side effects and potential benefits of treatment), the patient (family) agrees to implement the following specific plan:  When outside we recommend using a wide brim hat, sunglasses, long sleeve and pants, sunscreen with SPF 30+ with reapplication every 2 hours, or SPF specific clothing       What is a lentigo?  A lentigo is a pigmented flat or slightly raised lesion with a clearly defined edge. Unlike an ephelis (freckle), it does not fade in the winter months. There are several kinds of lentigo.  The name lentigo originally referred to its appearance resembling a small lentil. The plural of lentigo is lentigines, although “lentigos” is also in common use.    Who gets lentigines?  Lentigines can affect males and females of all ages and races. Solar lentigines are especially prevalent in fair skinned adults. Lentigines associated with syndromes are present at birth or arise during childhood.    What causes lentigines?  Common forms of lentigo are due to exposure to ultraviolet radiation:  Sun damage including sunburn   Indoor tanning   Phototherapy, especially photochemotherapy (PUVA)    Ionizing radiation, eg radiation therapy, can also cause lentigines.  Several familial syndromes associated with widespread lentigines originate from mutations in Derrick-MAP kinase, mTOR signaling and PTEN pathways.    What is the treatment for lentigines?  Most lentigines are left alone. Attempts to lighten them may not be successful. The following approaches are used:  SPF 50+ broad-spectrum sunscreen   Hydroquinone bleaching cream   Alpha hydroxy acids   Vitamin C   Retinoids   Azelaic acid   Chemical peels  Individual lesions can be permanently removed using:  Cryotherapy   Intense pulsed light   Pigment lasers    How can lentigines be prevented?  Lentigines associated with exposure ultraviolet radiation can be prevented by very careful sun  "protection. Clothing is more successful at preventing new lentigines than are sunscreens.    What is the outlook for lentigines?  Lentigines usually persist. They may increase in number with age and sun exposure. Some in sun-protected sites may fade and disappear.    ALONZO ANGIOMAS    Physical Exam:  Anatomic Location Affected:  trunk  Morphological Description:  Scattered cherry red, 1-4 mm papules.  Pertinent Positives:  Pertinent Negatives:    Additional History of Present Condition:      Assessment and Plan:  Based on a thorough discussion of this condition and the management approach to it (including a comprehensive discussion of the known risks, side effects and potential benefits of treatment), the patient (family) agrees to implement the following specific plan:  Monitor for changes  Benign, reassured      Assessment and Plan:    Cherry angioma, also known as Mitchell de Jhon spots, are benign vascular skin lesions. A \"cherry angioma\" is a firm red, blue or purple papule, 0.1-1 cm in diameter. When thrombosed, they can appear black in colour until evaluated with a dermatoscope when the red or purple colour is more easily seen. Cherry angioma may develop on any part of the body but most often appear on the scalp, face, lips and trunk.  An angioma is due to proliferating endothelial cells; these are the cells that line the inside of a blood vessel.    Angiomas can arise in early life or later in life; the most common type of angioma is a cherry angioma.  Cherry angiomas are very common in males and females of any age or race. They are more noticeable in white skin than in skin of colour. They markedly increase in number from about the age of 40. There may be a family history of similar lesions. Eruptive cherry angiomas have been rarely reported to be associated with internal malignancy. The cause of angiomas is unknown. Genetic analysis of cherry angiomas has shown that they frequently carry specific " somatic missense mutations in the GNAQ and GNA11 (Q209H) genes, which are involved in other vascular and melanocytic proliferations.        Scribe Attestation      I,:  Joi Frausto am acting as a scribe while in the presence of the attending physician.:       I,:  Tommie Amador MD personally performed the services described in this documentation    as scribed in my presence.:            This encounter (24694 or [id number]) has a MODERATE level of medical decision making (MDM) given the presence of at least 2 of the elements of MDM (below):  *MODERATE number and complexity of problems addressed: 1 or more chronic illnesses with exacerbation, progression, or side effects of treatment; OR 2 or more stable chronic illnesses; OR 1 undiagnosed new problem with uncertain prognosis; OR 1 acute illness with systemic symptoms; OR 1 acute uncomplicated injury  *MODERATE amount and/or complexity of data reviewed: this includes review of previous notes and/or lab tests, independent interpretation of tests performed by another healthcare professional, ordering tests, assessment requiring independent historian, or discussion with other healthcare professionals.  *MODERATE risk of complications and/or morbidity or mortality of patient management (for example, prescription drug management, decisions regarding minor surgery with identified patient or procedure risk factors).

## 2024-03-13 NOTE — PATIENT INSTRUCTIONS
"HISTORY OF SPITZ NEVUS    Plan:  We discussed that in children under 12 years old, a spitz nevus will be monitored and the course of the lesion is largely self-limited. When a Spitz nevus first appears, it will typically grow for a few months and then will stop growing and stay the same size, shape, and color. After a few years, spitz nevi sometimes get smaller, flatter, or even disappear. Because of the difficulty in definitively predicting the outcome of a spitz nevus in older children or adults, they are generally excised.  We discussed sun protection measures to help prevent changes in the nevus. Protect the skin when outdoors by wearing hats, long sleeves and pants when possible. When possible, avoid mid-day sun and seek shade. Use sunscreen on exposed skin, and reapply often to prevent sunburns and skin damage.   The rare entity, agminated spitz nevus, presents as multiple spitz naevi in a segmental presentation. It is an example of cutaneous mosaicism and is due to a genetic mutation.  No new spitz nevi present on todays exam  Follow up in 6 months      PROCEDURES PERFORMED TODAY ASSOCIATED WITH THIS CONDITION:          NONE           MELANOCYTIC NEVI (\"Moles\")    Assessment and Plan:  Based on a thorough discussion of this condition and the management approach to it (including a comprehensive discussion of the known risks, side effects and potential benefits of treatment), the patient (family) agrees to implement the following specific plan:  When outside we recommend using a wide brim hat, sunglasses, long sleeve and pants, sunscreen with SPF 30+ with reapplication every 2 hours, or SPF specific clothing   Benign, reassured  Annual skin check     Melanocytic Nevi  Melanocytic nevi (\"moles\") are tan or brown, raised or flat areas of the skin which have an increased number of melanocytes. Melanocytes are the cells in our body which make pigment and account for skin color.    Some moles are present at birth " "(I.e., \"congenital nevi\"), while others come up later in life (i.e., \"acquired nevi\").  The sun can stimulate the body to make more moles.  Sunburns are not the only thing that triggers more moles.  Chronic sun exposure can do it too.     Clinically distinguishing a healthy mole from melanoma may be difficult, even for experienced dermatologists. The \"ABCDE's\" of moles have been suggested as a means of helping to alert a person to a suspicious mole and the possible increased risk of melanoma.  The suggestions for raising alert are as follows:    Asymmetry: Healthy moles tend to be symmetric, while melanomas are often asymmetric.  Asymmetry means if you draw a line through the mole, the two halves do not match in color, size, shape, or surface texture. Asymmetry can be a result of rapid enlargement of a mole, the development of a raised area on a previously flat lesion, scaling, ulceration, bleeding or scabbing within the mole.  Any mole that starts to demonstrate \"asymmetry\" should be examined promptly by a board certified dermatologist.     Border: Healthy moles tend to have discrete, even borders.  The border of a melanoma often blends into the normal skin and does not sharply delineate the mole from normal skin.  Any mole that starts to demonstrate \"uneven borders\" should be examined promptly by a board certified dermatologist.     Color: Healthy moles tend to be one color throughout.  Melanomas tend to be made up of different colors ranging from dark black, blue, white, or red.  Any mole that demonstrates a color change should be examined promptly by a board certified dermatologist.     Diameter: Healthy moles tend to be smaller than 0.6 cm in size; an exception are \"congenital nevi\" that can be larger.  Melanomas tend to grow and can often be greater than 0.6 cm (1/4 of an inch, or the size of a pencil eraser). This is only a guideline, and many normal moles may be larger than 0.6 cm without being unhealthy.  " "Any mole that starts to change in size (small to bigger or bigger to smaller) should be examined promptly by a board certified dermatologist.     Evolving: Healthy moles tend to \"stay the same.\"  Melanomas may often show signs of change or evolution such as a change in size, shape, color, or elevation.  Any mole that starts to itch, bleed, crust, burn, hurt, or ulcerate or demonstrate a change or evolution should be examined promptly by a board certified dermatologist.        LENTIGO    Assessment and Plan:  Based on a thorough discussion of this condition and the management approach to it (including a comprehensive discussion of the known risks, side effects and potential benefits of treatment), the patient (family) agrees to implement the following specific plan:  When outside we recommend using a wide brim hat, sunglasses, long sleeve and pants, sunscreen with SPF 30+ with reapplication every 2 hours, or SPF specific clothing       What is a lentigo?  A lentigo is a pigmented flat or slightly raised lesion with a clearly defined edge. Unlike an ephelis (freckle), it does not fade in the winter months. There are several kinds of lentigo.  The name lentigo originally referred to its appearance resembling a small lentil. The plural of lentigo is lentigines, although “lentigos” is also in common use.    Who gets lentigines?  Lentigines can affect males and females of all ages and races. Solar lentigines are especially prevalent in fair skinned adults. Lentigines associated with syndromes are present at birth or arise during childhood.    What causes lentigines?  Common forms of lentigo are due to exposure to ultraviolet radiation:  Sun damage including sunburn   Indoor tanning   Phototherapy, especially photochemotherapy (PUVA)    Ionizing radiation, eg radiation therapy, can also cause lentigines.  Several familial syndromes associated with widespread lentigines originate from mutations in Derrick-MAP kinase, mTOR " "signaling and PTEN pathways.    What is the treatment for lentigines?  Most lentigines are left alone. Attempts to lighten them may not be successful. The following approaches are used:  SPF 50+ broad-spectrum sunscreen   Hydroquinone bleaching cream   Alpha hydroxy acids   Vitamin C   Retinoids   Azelaic acid   Chemical peels  Individual lesions can be permanently removed using:  Cryotherapy   Intense pulsed light   Pigment lasers    How can lentigines be prevented?  Lentigines associated with exposure ultraviolet radiation can be prevented by very careful sun protection. Clothing is more successful at preventing new lentigines than are sunscreens.    What is the outlook for lentigines?  Lentigines usually persist. They may increase in number with age and sun exposure. Some in sun-protected sites may fade and disappear.    ALONZO ANGIOMAS    Assessment and Plan:  Based on a thorough discussion of this condition and the management approach to it (including a comprehensive discussion of the known risks, side effects and potential benefits of treatment), the patient (family) agrees to implement the following specific plan:  Monitor for changes  Benign, reassured      Assessment and Plan:    Cherry angioma, also known as Mitchell de John spots, are benign vascular skin lesions. A \"cherry angioma\" is a firm red, blue or purple papule, 0.1-1 cm in diameter. When thrombosed, they can appear black in colour until evaluated with a dermatoscope when the red or purple colour is more easily seen. Cherry angioma may develop on any part of the body but most often appear on the scalp, face, lips and trunk.  An angioma is due to proliferating endothelial cells; these are the cells that line the inside of a blood vessel.    Angiomas can arise in early life or later in life; the most common type of angioma is a cherry angioma.  Cherry angiomas are very common in males and females of any age or race. They are more noticeable in " white skin than in skin of colour. They markedly increase in number from about the age of 40. There may be a family history of similar lesions. Eruptive cherry angiomas have been rarely reported to be associated with internal malignancy. The cause of angiomas is unknown. Genetic analysis of cherry angiomas has shown that they frequently carry specific somatic missense mutations in the GNAQ and GNA11 (Q209H) genes, which are involved in other vascular and melanocytic proliferations.

## 2024-08-19 ENCOUNTER — APPOINTMENT (OUTPATIENT)
Dept: LAB | Facility: CLINIC | Age: 24
End: 2024-08-19

## 2024-08-19 DIAGNOSIS — Z02.1 ENCOUNTER FOR PRE-EMPLOYMENT EXAMINATION: ICD-10-CM

## 2024-08-19 LAB — RUBV IGG SERPL IA-ACNC: 17.2 IU/ML

## 2024-08-19 PROCEDURE — 86480 TB TEST CELL IMMUN MEASURE: CPT

## 2024-08-19 PROCEDURE — 86735 MUMPS ANTIBODY: CPT

## 2024-08-19 PROCEDURE — 86762 RUBELLA ANTIBODY: CPT

## 2024-08-19 PROCEDURE — 36415 COLL VENOUS BLD VENIPUNCTURE: CPT

## 2024-08-19 PROCEDURE — 86787 VARICELLA-ZOSTER ANTIBODY: CPT

## 2024-08-19 PROCEDURE — 86765 RUBEOLA ANTIBODY: CPT

## 2024-08-20 LAB
GAMMA INTERFERON BACKGROUND BLD IA-ACNC: <0 IU/ML
M TB IFN-G BLD-IMP: NEGATIVE
M TB IFN-G CD4+ BCKGRND COR BLD-ACNC: 0.01 IU/ML
M TB IFN-G CD4+ BCKGRND COR BLD-ACNC: 0.05 IU/ML
MEV IGG SER QL IA: ABNORMAL
MITOGEN IGNF BCKGRD COR BLD-ACNC: 10 IU/ML
MUV IGG SER QL IA: NORMAL
VZV IGG SER QL IA: ABNORMAL

## 2024-09-17 ENCOUNTER — OFFICE VISIT (OUTPATIENT)
Dept: DERMATOLOGY | Facility: CLINIC | Age: 24
End: 2024-09-17
Payer: COMMERCIAL

## 2024-09-17 VITALS — WEIGHT: 170 LBS | HEIGHT: 68 IN | BODY MASS INDEX: 25.76 KG/M2

## 2024-09-17 DIAGNOSIS — D22.5 MELANOCYTIC NEVUS OF TRUNK: Primary | ICD-10-CM

## 2024-09-17 DIAGNOSIS — Z87.898 HISTORY OF ATYPICAL NEVUS: ICD-10-CM

## 2024-09-17 PROCEDURE — 99213 OFFICE O/P EST LOW 20 MIN: CPT | Performed by: NURSE PRACTITIONER

## 2024-09-17 NOTE — PROGRESS NOTES
"[facility] Note     Patient Name: Chanel Muñoz  Encounter Date: 09/17/2024     Have you been cared for by a [facility] Dermatologist in the last 3 years and, if so, which description applies to you?    Yes.  I have been here within the last 3 years, and my medical history has NOT changed since that time.  I am FEMALE/of child-bearing potential.    REVIEW OF SYSTEMS:  Have you recently had or currently have any of the following? No changes in my recent health.   PAST MEDICAL HISTORY:  Have you personally ever had or currently have any of the following?  If \"YES,\" then please provide more detail. No changes in my medical history.   HISTORY OF IMMUNOSUPPRESSION: Do you have a history of any of the following:  Systemic Immunosuppression such as Diabetes, Biologic or Immunotherapy, Chemotherapy, Organ Transplantation, Bone Marrow Transplantation or Prednisone?  No     Answering \"YES\" requires the addition of the dotphrase \"IMMUNOSUPPRESSED\" as the first diagnosis of the patient's visit.   FAMILY HISTORY:  Any \"first degree relatives\" (parent, brother, sister, or child) with the following?    No changes in my family's known health.   PATIENT EXPERIENCE:    Do you want the Dermatologist to perform a COMPLETE skin exam today including a clinical examination under the \"bra and underwear\" areas?  NO  If necessary, do we have your permission to call and leave a detailed message on your Preferred Phone number that includes your specific medical information?  Yes      Allergies   Allergen Reactions    Codeine Anaphylaxis     Childhood allergy.      Current Outpatient Medications:     norethindrone-ethinyl estradiol (Junel FE 1/20) 1-20 MG-MCG per tablet, Take 1 tablet by mouth daily, Disp: 84 tablet, Rfl: 3          Whom besides the patient is providing clinical information about today's encounter?   NO ADDITIONAL HISTORIAN (patient alone provided history)    Physical Exam and Assessment/Plan by " "Diagnosis:    HISTORY OF MULTIPLE SPITZ NEVUS     Physical Exam:  Anatomic Location: Multiple locations in past (reports scanned into media)  Morphologic Description:  Well-circumscribed, dome-shaped, brown papule.   Multiple colors within lesion? No  Multiple lesions? No  Segmental distribution? No  Pertinent Positives:  Pain on palpation? No  Active bleeding? No  Itching? No  Lesion growing? No  Pertinent Negatives:  Additional History of Present Condition:  patient has a history of multiple spitz nevi that have been biopsied and removed. Patient is present with mother for 6 month follow up on spot on abdomen.     Plan:  We discussed that in children under 12 years old, a spitz nevus will be monitored and the course of the lesion is largely self-limited. When a Spitz nevus first appears, it will typically grow for a few months and then will stop growing and stay the same size, shape, and color. After a few years, spitz nevi sometimes get smaller, flatter, or even disappear. Because of the difficulty in definitively predicting the outcome of a spitz nevus in older children or adults, they are generally excised.  We discussed sun protection measures to help prevent changes in the nevus. Protect the skin when outdoors by wearing hats, long sleeves and pants when possible. When possible, avoid mid-day sun and seek shade. Use sunscreen on exposed skin, and reapply often to prevent sunburns and skin damage.   Continue 6 month skin checks due to hx     PROCEDURES PERFORMED TODAY ASSOCIATED WITH THIS CONDITION:          NONE     Medical Complexity:    CHRONIC ILLNESS (expected duration of >1 year):  STABLE (i.e., AT TREATMENT GOAL). \"Stable\" refers to treatment goals for the individual patient.  A patient not at treatment goal is NOT stable even if the condition is not changing and the patient is asymptomatic because the risk of morbidity without treatment is significant.        ACNE VULGARIS    Physical Exam:  Anatomic " "Locations Involved: Face  Global Assessment: ALMOST CLEAR: A few scattered comedones and a few small inflammatory papules.   Scarring Present? NONE     Additional History of Present Condition:  Patient states she has been noticing more bumps on skin of chin and forehead with some active pimples; she does not use any topical prescriptions.        TODAY'S PLAN:     PRESCRIPTION MANAGEMENT:  Several treatment options were discussed including topical retinoids and their side effects.     Skin Hygiene:      Wash affected areas (face, chest, and back) TWICE A DAY with a mild cleanser such as Cerve.  Use only mild cleansers (hypoallergenic and without fragrances) and fragrance free detergent (not \"unscented\" products which contain a masking agent); we discussed avoiding irritants/fragranced products.  Apply a good oil-free facial moisturizer AT LEAST TWO TIMES A DAY \" such as Cerve.  Minimize the application of oils and cosmetics to the affected skin.  This includes HAIR PRODUCTS such as \"leave in\" conditioners.  Unless the product specifically states that it \"won't cause acne,\" \"won't clog pores,\" and/or \"is non-comedogenic\" then it may actually CAUSE acne.  If you smoke, STOP. Nicotine increases sebum retention and increased scale within the follicles, forming comedones (blackheads and whiteheads).  Abrasive treatments such as dermabrasion and spa facials may aggravate inflammatory acne.  Do NOT scratch or pick your acne bumps.  The evidence that diet directly affects acne remains weak.  However, diet does affect your overall health.  Eat plenty of fresh fruit and vegetables.  Avoid protein or amino acid supplements, particularly if they contain leucine. Consider a low-glycemic, low-protein and low-dairy diet.  Be mindful that certain medications may cause of aggravate acne.  Make sure to tell your Dermatologist if you start a new prescription, nutritional supplement, and/or herbal remedy.      MORNING Topical Regimen:  " "    NONE.      EVENING Topical Regimen:      Adapalene 0.1% gel (available \"over-the-counter;\" usually under $35) AT LEAST 1 HOUR BEFORE BEDTIME:  Evenly spread a SINGLE pea-sized amount of this medication over your entire face, avoiding the eyes and corners of the mouth.      SYSTEMIC Strategies:      NONE        MEDICAL DECISION MAKING  Treatment Goal:  Resolution of the CHRONIC condition.       Chronic condition is NOT at treatment goal.  It is progressing along its expected course OR is poorly-controlled.           MELANOCYTIC NEVI (\"Moles\")    Physical Exam:  Anatomic Location Affected: umbilicus   Morphological Description:  0.8 cm ovoid, symmetrical-appearing, even bordered, dark brown papule          Additional History of Present Condition:  hx of multiple spitz nevi removed in the past. Patient instructed to return today for spot recheck. She denies any changes.     Assessment and Plan:  Based on a thorough discussion of this condition and the management approach to it (including a comprehensive discussion of the known risks, side effects and potential benefits of treatment), the patient (family) agrees to implement the following specific plan:  Cont w/ 6 month skin checks     Melanocytic Nevi  Melanocytic nevi (\"moles\") are tan or brown, raised or flat areas of the skin which have an increased number of melanocytes. Melanocytes are the cells in our body which make pigment and account for skin color.    Some moles are present at birth (I.e., \"congenital nevi\"), while others come up later in life (i.e., \"acquired nevi\").  The sun can stimulate the body to make more moles.  Sunburns are not the only thing that triggers more moles.  Chronic sun exposure can do it too.     Clinically distinguishing a healthy mole from melanoma may be difficult, even for experienced dermatologists. The \"ABCDE's\" of moles have been suggested as a means of helping to alert a person to a suspicious mole and the possible increased " "risk of melanoma.  The suggestions for raising alert are as follows:    Asymmetry: Healthy moles tend to be symmetric, while melanomas are often asymmetric.  Asymmetry means if you draw a line through the mole, the two halves do not match in color, size, shape, or surface texture. Asymmetry can be a result of rapid enlargement of a mole, the development of a raised area on a previously flat lesion, scaling, ulceration, bleeding or scabbing within the mole.  Any mole that starts to demonstrate \"asymmetry\" should be examined promptly by a board certified dermatologist.     Border: Healthy moles tend to have discrete, even borders.  The border of a melanoma often blends into the normal skin and does not sharply delineate the mole from normal skin.  Any mole that starts to demonstrate \"uneven borders\" should be examined promptly by a board certified dermatologist.     Color: Healthy moles tend to be one color throughout.  Melanomas tend to be made up of different colors ranging from dark black, blue, white, or red.  Any mole that demonstrates a color change should be examined promptly by a board certified dermatologist.     Diameter: Healthy moles tend to be smaller than 0.6 cm in size; an exception are \"congenital nevi\" that can be larger.  Melanomas tend to grow and can often be greater than 0.6 cm (1/4 of an inch, or the size of a pencil eraser). This is only a guideline, and many normal moles may be larger than 0.6 cm without being unhealthy.  Any mole that starts to change in size (small to bigger or bigger to smaller) should be examined promptly by a board certified dermatologist.     Evolving: Healthy moles tend to \"stay the same.\"  Melanomas may often show signs of change or evolution such as a change in size, shape, color, or elevation.  Any mole that starts to itch, bleed, crust, burn, hurt, or ulcerate or demonstrate a change or evolution should be examined promptly by a board certified dermatologist.  " "    Dysplastic Nevi  Dysplastic moles are moles that fit the ABCDE rules of melanoma but are not identified as melanomas when examined under the microscope.  They may indicate an increased risk of melanoma in that person. If there is a family history of melanoma, most experts agree that the person may be at an increased risk for developing a melanoma.  Experts still do not agree on what dysplastic moles mean in patients without a personal or family history of melanoma.  Dysplastic moles are usually larger than common moles and have different colors within it with irregular borders. The appearance can be very similar to a melanoma. Biopsies of dysplastic moles may show abnormalities which are different from a regular mole.      Melanoma  Malignant melanoma is a type of skin cancer that can be deadly if it spreads throughout the body. The incidence of melanoma in the [place] is growing faster than any other cancer. Melanoma usually grows near the surface of the skin for a period of time, and then begins to grow deeper into the skin. Once it grows deeper into the skin, the risk of spread to other organs greatly increases. Therefore, early detection and removal of a malignant melanoma may result in a better chance at a complete cure; removal after the tumor has spread may not be as effective, leading to worse clinical outcomes such as death.    The true rate of nevus transformation into a melanoma is unknown. It has been estimated that the lifetime risk for any acquired melanocytic nevus on any 20-year-old individual transforming into melanoma by age 80 is 0.03% (1 in 3,164) for men and 0.009% (1 in 10,800) for women.     The appearance of a \"new mole\" remains one of the most reliable methods for identifying a malignant melanoma.  Occasionally, melanomas appear as rapidly growing, blue-black, dome-shaped bumps within a previous mole or previous area of normal skin.  Other times, melanomas are suspected when a mole " "suddenly appears or changes. Itching, burning, or pain in a pigmented lesion should increase suspicion, but most patients with early melanoma have no skin discomfort whatsoever.  Melanoma can occur anywhere on the skin, including areas that are difficult for self-examination. Many melanomas are first noticed by other family members.  Suspicious-looking moles may be removed for microscopic examination.       You may be able to prevent death from melanoma by doing two simple things:    Try to avoid unnecessary sun exposure and protect your skin when it is exposed to the sun.  People who live near the equator, people who have intermittent exposures to large amounts of sun, and people who have had sunburns in childhood or adolescence have an increased risk for melanoma. Sun sense and vigilant sun protection may be keys to helping to prevent melanoma.  We recommend wearing UPF-rated sun protective clothing and sunglasses whenever possible and applying a moisturizer-sunscreen combination product (SPF 50+) such as Neutrogena Daily Defense to sun exposed areas of skin at least three times a day.    Have your moles regularly examined by a board certified dermatologist AND by yourself or a family member/friend at home.  We recommend that you have your moles examined at least once a year by a board certified dermatologist.  Use your birthday as an annual reminder to have your \"Birthday Suit\" (I.e., your skin) examined; it is a nice birthday gift to yourself to know that your skin is healthy appearing!  Additionally, at-home self examinations may be helpful for detecting a possible melanoma.  Use the ABCDEs we discussed and check your moles once a month at home.       Scribe Attestation      I,:  Sushila Palafox am acting as a scribe while in the presence of the attending physician.:       I,:  SHAWN Hernandez personally performed the services described in this documentation    as scribed in my presence.:          "

## 2024-11-06 ENCOUNTER — NURSE TRIAGE (OUTPATIENT)
Age: 24
End: 2024-11-06

## 2024-11-06 DIAGNOSIS — Z30.8 ENCOUNTER FOR OTHER CONTRACEPTIVE MANAGEMENT: Primary | ICD-10-CM

## 2024-11-06 RX ORDER — NORETHINDRONE ACETATE AND ETHINYL ESTRADIOL AND FERROUS FUMARATE 1MG-20(21)
1 KIT ORAL DAILY
Qty: 28 TABLET | Refills: 3 | Status: SHIPPED | OUTPATIENT
Start: 2024-11-06 | End: 2024-11-14 | Stop reason: SDUPTHER

## 2024-11-06 NOTE — TELEPHONE ENCOUNTER
"Patient called office stating she was ordered Junel birth control , however the pharmacy gave her Aurovela in place of it. She states she started taking the Aurovela on 10/27 and on this day she started having abdominal pain and cramping she rates a 4. She states the pain is still continuing. She denies any other symptoms to note at this time. She requests the order be rewritten to state Brand specific as she doesn't have pain with Junel and requests ot be ordered to [facility] in [facility]. Will route to provider for order and further advice. Advised patient to call back if pain continues once she starts Junel again.         Reason for Disposition   Caller has NON-URGENT question and triager unable to answer question    Answer Assessment - Initial Assessment Questions  1. TYPE: \"What is the name of the birth control pill you are using?\"      Aurovela   2. PACK TYPE: \"How do you take your birth control pill?\"      With placebo pills   3. START DATE: \"When did you first start taking this birth control pill?\"      Last Sunday - 10/27/24  4. SYMPTOM: \"What is the main symptom (or question) you're concerned about?\"      Abdominal pain / cramping   5. ONSET: \"When did the pain start?\"      10/27/24   6. VAGINAL BLEEDING: \"Are you having any unusual vaginal bleeding?\"      No   7. ABDOMEN OR PELVIC PAIN: \"Are you have any pain in your abdomen or pelvic area?\" (Scale: 0, 1-10; none, mild, moderate, severe)      4  8. MISSED OR LATE PILLS: \"Did you miss or take any pills late?\" If Yes, ask: \"When? How many pills?\"  Note: Pill is considered missed if taken more than 24 hours later than scheduled time.      No   9. PREGNANCY: \"Are you concerned you might be pregnant?\" \"When was your last menstrual period?\"      No.  Lmp: 10/22.    Protocols used: Contraception - Birth Control Pills - Combined-Adult-[place]    "

## 2024-11-14 DIAGNOSIS — Z30.8 ENCOUNTER FOR OTHER CONTRACEPTIVE MANAGEMENT: ICD-10-CM

## 2024-11-14 RX ORDER — NORETHINDRONE ACETATE AND ETHINYL ESTRADIOL AND FERROUS FUMARATE 1MG-20(21)
1 KIT ORAL DAILY
Qty: 28 TABLET | Refills: 0 | Status: SHIPPED | OUTPATIENT
Start: 2024-11-14

## 2024-11-14 NOTE — TELEPHONE ENCOUNTER
Pharmacy states they did not get the script  Patient is at the pharmacy now    Reason for call:   [x] Refill   [] Prior Auth  [] Other:     Office:   [] PCP/Provider -   [x] Specialty/Provider - OBGYN POD  Authorized By: Marina Palacio PA-C    Medication: Junel FE 1/20 1-20 MG-MCG per tablet     Dose/Frequency: Take 1 tablet by mouth daily Dispense Junel 1/20 FE, do not substitute     Quantity: 28 tablet     Pharmacy: [facility] #[id number] - [address]     Does the patient have enough for 3 days?   [] Yes   [x] No - Send as HP to POD

## 2024-12-11 DIAGNOSIS — Z30.8 ENCOUNTER FOR OTHER CONTRACEPTIVE MANAGEMENT: ICD-10-CM

## 2024-12-11 RX ORDER — NORETHINDRONE ACETATE AND ETHINYL ESTRADIOL AND FERROUS FUMARATE 1MG-20(21)
1 KIT ORAL DAILY
Qty: 84 TABLET | Refills: 0 | Status: SHIPPED | OUTPATIENT
Start: 2024-12-11

## 2024-12-16 ENCOUNTER — OFFICE VISIT (OUTPATIENT)
Dept: URGENT CARE | Facility: CLINIC | Age: 24
End: 2024-12-16
Payer: COMMERCIAL

## 2024-12-16 VITALS
BODY MASS INDEX: 25.11 KG/M2 | TEMPERATURE: 97.3 F | DIASTOLIC BLOOD PRESSURE: 70 MMHG | WEIGHT: 160 LBS | OXYGEN SATURATION: 99 % | HEIGHT: 67 IN | SYSTOLIC BLOOD PRESSURE: 122 MMHG | HEART RATE: 79 BPM

## 2024-12-16 DIAGNOSIS — J02.9 SORE THROAT: ICD-10-CM

## 2024-12-16 DIAGNOSIS — T36.95XA ANTIBIOTIC-INDUCED YEAST INFECTION: ICD-10-CM

## 2024-12-16 DIAGNOSIS — B37.9 ANTIBIOTIC-INDUCED YEAST INFECTION: ICD-10-CM

## 2024-12-16 DIAGNOSIS — J02.9 ACUTE PHARYNGITIS, UNSPECIFIED ETIOLOGY: Primary | ICD-10-CM

## 2024-12-16 PROCEDURE — 99213 OFFICE O/P EST LOW 20 MIN: CPT

## 2024-12-16 RX ORDER — FLUCONAZOLE 150 MG/1
150 TABLET ORAL ONCE
Qty: 1 TABLET | Refills: 0 | Status: SHIPPED | OUTPATIENT
Start: 2024-12-16 | End: 2024-12-16

## 2024-12-16 RX ORDER — PREDNISONE 20 MG/1
40 TABLET ORAL DAILY
Qty: 10 TABLET | Refills: 0 | Status: SHIPPED | OUTPATIENT
Start: 2024-12-16 | End: 2024-12-21

## 2024-12-16 RX ORDER — AMOXICILLIN 500 MG/1
500 TABLET, FILM COATED ORAL 2 TIMES DAILY
Qty: 20 TABLET | Refills: 0 | Status: SHIPPED | OUTPATIENT
Start: 2024-12-16 | End: 2024-12-26

## 2024-12-16 NOTE — PATIENT INSTRUCTIONS
Take antibiotic as directed for next 10 days and steroid as directed for next 5 days. Take diflucan prior to taking antibiotic to help prevent yeast infection. Complete entire course of therapy even if symptoms improve and take medication with food to avoid upset stomach. Continue over-the-counter products for other symptoms: tylenol for fevers/pain, sudafed and flonase (fluticasone) with nasal saline for congestion, mucinex for cough, and airborne/emergen-c for vitamin supplementation. Avoid taking ibuprofen while taking steroid. Follow-up with PCP in 3-5 days if no improvement of symptoms. Report to the ER if symptoms worsen.

## 2024-12-16 NOTE — PROGRESS NOTES
[facility]        NAME: Chanel Muñoz is a 24 y.o. female  : 2000    MRN: [id number]  DATE: 2024  TIME: 1:48 PM    Assessment and Plan   Acute pharyngitis, unspecified etiology [J02.9]  1. Acute pharyngitis, unspecified etiology  amoxicillin (AMOXIL) 500 MG tablet    predniSONE 20 mg tablet      2. Sore throat        3. Antibiotic-induced yeast infection  fluconazole (DIFLUCAN) 150 mg tablet        Rapid Strep negative. Antibiotics as directed given symptom onset >2 weeks. Prednisone as directed for pain and further symptom relief. Diflucan as requested to prevent antibiotic-induced yeast infection per patient request. VSS in clinic, appears in no acute distress. Educated on use of OTC products for additional relief of symptoms. Advised close follow-up with PCP or to report to the ER if symptoms worsen. Patient verbalizes understanding and agreeable to plan.       Patient Instructions     Take antibiotic as directed for next 10 days and steroid as directed for next 5 days. Take diflucan prior to taking antibiotic to help prevent yeast infection. Complete entire course of therapy even if symptoms improve and take medication with food to avoid upset stomach. Continue over-the-counter products for other symptoms: tylenol for fevers/pain, sudafed and flonase (fluticasone) with nasal saline for congestion, mucinex for cough, and airborne/emergen-c for vitamin supplementation. Avoid taking ibuprofen while taking steroid. Follow-up with PCP in 3-5 days if no improvement of symptoms. Report to the ER if symptoms worsen.      Chief Complaint     Chief Complaint   Patient presents with    Sore Throat     Sick on and off for 3-4 weeks, on and off symptoms of body aches, fevers, tired more than usual, sore / swollen throat, taken ibuprofen         History of Present Illness       24 year old female presents for evaluation of sore throat and intermittent fevers for the past 2-3 weeks. She relates  she was around co-workers with similar symptoms. She reports fevers have intermittent for the past few weeks - last known fever was 1 week ago. She denies SOB or productive sputum but reports some associated congestion. She has taken ibuprofen for symptoms with minimal relief.    Sore Throat   This is a recurrent problem. The current episode started 1 to 4 weeks ago. The problem has been unchanged. The pain is worse on the right side. The maximum temperature recorded prior to her arrival was 101 - 101.9 F. The fever has been present for 3 to 4 days. The pain is at a severity of 5/10. Associated symptoms include congestion, a plugged ear sensation and swollen glands. Pertinent negatives include no abdominal pain, coughing, diarrhea, drooling, ear pain, headaches, hoarse voice, neck pain, shortness of breath, stridor, trouble swallowing or vomiting. She has had no exposure to strep or mono. She has tried NSAIDs for the symptoms. The treatment provided mild relief.       Review of Systems   Review of Systems   Constitutional:  Positive for fever. Negative for activity change, appetite change, chills and fatigue.   HENT:  Positive for congestion, postnasal drip, rhinorrhea and sore throat. Negative for drooling, ear pain, hoarse voice, sinus pressure, sinus pain, sneezing and trouble swallowing.    Eyes:  Negative for visual disturbance.   Respiratory:  Negative for cough, chest tightness, shortness of breath and stridor.    Cardiovascular:  Negative for chest pain and palpitations.   Gastrointestinal:  Negative for abdominal pain, constipation, diarrhea, nausea and vomiting.   Musculoskeletal:  Negative for arthralgias, back pain, myalgias and neck pain.   Skin:  Negative for color change and pallor.   Allergic/Immunologic: Negative for environmental allergies and food allergies.   Neurological:  Negative for dizziness, light-headedness and headaches.         Current Medications       Current Outpatient Medications:     " amoxicillin (AMOXIL) 500 MG tablet, Take 1 tablet (500 mg total) by mouth 2 (two) times a day for 10 days, Disp: 20 tablet, Rfl: 0    fluconazole (DIFLUCAN) 150 mg tablet, Take 1 tablet (150 mg total) by mouth once for 1 dose, Disp: 1 tablet, Rfl: 0    Junel FE 1/20 1-20 MG-MCG per tablet, TAKE ONE TABLET BY MOUTH EVERY DAY, Disp: 84 tablet, Rfl: 0    predniSONE 20 mg tablet, Take 2 tablets (40 mg total) by mouth daily for 5 days, Disp: 10 tablet, Rfl: 0    Current Allergies     Allergies as of 12/16/2024 - Reviewed 12/16/2024   Allergen Reaction Noted    Codeine Anaphylaxis 11/06/2023            The following portions of the patient's history were reviewed and updated as appropriate: allergies, current medications, past family history, past medical history, past social history, past surgical history and problem list.     Past Medical History:   Diagnosis Date    Melanoma (HCC)     toe, elbow chest    Skin cancer 2013       Past Surgical History:   Procedure Laterality Date    SKIN BIOPSY  2013    SKIN LESION EXCISION Right     4th toe       Family History   Problem Relation Age of Onset    Obesity Mother     Hypertension Father     Melanoma Father     Diabetes Brother         Type 1         Medications have been verified.        Objective   /70   Pulse 79   Temp (!) 97.3 °F (36.3 °C)   Ht 5' 7\" (1.702 m)   Wt 72.6 kg (160 lb)   LMP 12/08/2024 (Approximate)   SpO2 99%   BMI 25.06 kg/m²        Physical Exam     Physical Exam  Vitals and nursing note reviewed.   Constitutional:       General: She is awake. She is not in acute distress.     Appearance: Normal appearance. She is well-developed and normal weight.   HENT:      Head: Normocephalic and atraumatic.      Right Ear: Hearing, ear canal and external ear normal. A middle ear effusion is present.      Left Ear: Hearing, ear canal and external ear normal. A middle ear effusion is present.      Nose: Congestion and rhinorrhea present. Rhinorrhea is " clear.      Right Turbinates: Not enlarged, swollen or pale.      Left Turbinates: Not enlarged, swollen or pale.      Right Sinus: No maxillary sinus tenderness or frontal sinus tenderness.      Left Sinus: No maxillary sinus tenderness or frontal sinus tenderness.      Mouth/Throat:      Lips: Pink.      Mouth: Mucous membranes are moist.      Pharynx: Uvula midline. Pharyngeal swelling and posterior oropharyngeal erythema present. No oropharyngeal exudate, uvula swelling or postnasal drip.      Tonsils: No tonsillar exudate or tonsillar abscesses. 3+ on the right. 3+ on the left.   Eyes:      Conjunctiva/sclera: Conjunctivae normal.      Pupils: Pupils are equal, round, and reactive to light.   Cardiovascular:      Rate and Rhythm: Normal rate and regular rhythm.      Pulses: Normal pulses.      Heart sounds: Normal heart sounds.   Pulmonary:      Effort: Pulmonary effort is normal.      Breath sounds: Normal breath sounds.   Musculoskeletal:      Cervical back: Full passive range of motion without pain, normal range of motion and neck supple.   Lymphadenopathy:      Cervical: Cervical adenopathy present.   Skin:     General: Skin is warm and dry.   Neurological:      General: No focal deficit present.      Mental Status: She is alert and oriented to person, place, and time.   Psychiatric:         Mood and Affect: Mood normal.         Behavior: Behavior normal. Behavior is cooperative.         Thought Content: Thought content normal.         Judgment: Judgment normal.

## 2025-01-14 ENCOUNTER — TELEPHONE (OUTPATIENT)
Age: 25
End: 2025-01-14

## 2025-01-14 ENCOUNTER — OFFICE VISIT (OUTPATIENT)
Dept: FAMILY MEDICINE CLINIC | Facility: CLINIC | Age: 25
End: 2025-01-14
Payer: COMMERCIAL

## 2025-01-14 VITALS
DIASTOLIC BLOOD PRESSURE: 66 MMHG | BODY MASS INDEX: 25.55 KG/M2 | WEIGHT: 162.8 LBS | HEIGHT: 67 IN | RESPIRATION RATE: 14 BRPM | SYSTOLIC BLOOD PRESSURE: 140 MMHG | HEART RATE: 97 BPM | OXYGEN SATURATION: 100 % | TEMPERATURE: 98 F

## 2025-01-14 DIAGNOSIS — J03.90 TONSILLITIS: Primary | ICD-10-CM

## 2025-01-14 PROCEDURE — 99213 OFFICE O/P EST LOW 20 MIN: CPT | Performed by: FAMILY MEDICINE

## 2025-01-14 RX ORDER — PREDNISONE 50 MG/1
50 TABLET ORAL DAILY
Qty: 30 TABLET | Refills: 0 | Status: SHIPPED | OUTPATIENT
Start: 2025-01-14 | End: 2025-01-14

## 2025-01-14 RX ORDER — PREDNISONE 50 MG/1
50 TABLET ORAL DAILY
Qty: 5 TABLET | Refills: 0 | Status: SHIPPED | OUTPATIENT
Start: 2025-01-14 | End: 2025-01-19

## 2025-01-14 NOTE — TELEPHONE ENCOUNTER
Patient called to notify office that [facility] will not fill the script until the office contacts the pharmacy. Patient would like a call when this is sent in/corrected. Patient states she would like to pick this up from the pharmacy before they close tonight if possible.

## 2025-01-14 NOTE — TELEPHONE ENCOUNTER
Received call from Mary at [facility] pharmacy to confirm order for predniSONE 50 mg tablet is daily use for 30 days. Please confirm with pharmacy order is correct or change order.

## 2025-01-14 NOTE — PROGRESS NOTES
"Name: Chanel Muñoz      : 2000      MRN: [id number]  Encounter Provider: Bernie Nicolas DO  Encounter Date: 2025   Encounter department: [facility]  :  Assessment & Plan  Tonsillitis  Unilateral and worsening  Augmentin BID  Prednisone 50mg for pain and swelling  Call if sx don't resolve    Orders:    predniSONE 50 mg tablet; Take 1 tablet (50 mg total) by mouth daily    amoxicillin-clavulanate (AUGMENTIN) 875-125 mg per tablet; Take 1 tablet by mouth every 12 (twelve) hours for 7 days           History of Present Illness     Sore Throat   This is a recurrent problem. The current episode started in the past 7 days. The problem has been rapidly worsening. The pain is worse on the left side. There has been no fever. The pain is at a severity of 5/10. The pain is moderate. Associated symptoms include congestion, coughing, diarrhea, ear pain, a hoarse voice, neck pain, swollen glands and trouble swallowing. Pertinent negatives include no abdominal pain, drooling, ear discharge, headaches, plugged ear sensation, shortness of breath, stridor or vomiting.     Pt c/o 1 week ST L>R.  Feels swollen.  Difficulty swallowing.  Mild cough, congestion, but not severe.  No fevers.  Has taken otcs with no improvement    Review of Systems   HENT:  Positive for congestion, ear pain, hoarse voice, sore throat and trouble swallowing. Negative for drooling and ear discharge.    Respiratory:  Positive for cough. Negative for shortness of breath and stridor.    Gastrointestinal:  Positive for diarrhea. Negative for abdominal pain and vomiting.   Musculoskeletal:  Positive for neck pain.   Neurological:  Negative for headaches.     See hpi    Objective   /66   Pulse 97   Temp 98 °F (36.7 °C) (Temporal)   Resp 14   Ht 5' 7\" (1.702 m)   Wt 73.8 kg (162 lb 12.8 oz)   LMP 2024 (Approximate)   SpO2 100%   BMI 25.50 kg/m²      Physical Exam  Constitutional:       Appearance: She is " well-developed.   HENT:      Head: Normocephalic and atraumatic.      Right Ear: Tympanic membrane and ear canal normal.      Left Ear: Tympanic membrane and ear canal normal.      Nose: No congestion.      Mouth/Throat:      Mouth: Mucous membranes are moist.      Pharynx: Posterior oropharyngeal erythema present.      Comments: L tonsil enlarged.  Appears to have a small hematoma.  No exudates  Eyes:      Conjunctiva/sclera: Conjunctivae normal.      Pupils: Pupils are equal, round, and reactive to light.   Cardiovascular:      Rate and Rhythm: Normal rate and regular rhythm.      Heart sounds: No murmur heard.     No friction rub. No gallop.   Lymphadenopathy:      Cervical: Cervical adenopathy present.   Neurological:      General: No focal deficit present.      Mental Status: She is alert and oriented to person, place, and time.

## 2025-02-26 DIAGNOSIS — Z30.8 ENCOUNTER FOR OTHER CONTRACEPTIVE MANAGEMENT: ICD-10-CM

## 2025-02-26 RX ORDER — NORETHINDRONE ACETATE AND ETHINYL ESTRADIOL AND FERROUS FUMARATE 1MG-20(21)
1 KIT ORAL DAILY
Qty: 84 TABLET | Refills: 0 | Status: CANCELLED | OUTPATIENT
Start: 2025-02-26

## 2025-02-26 NOTE — TELEPHONE ENCOUNTER
Reason for call:   [x] Refill   [] Prior Auth  [x] Other: Patient states pharmacy notified her that she needs a new script     Office:   [] PCP/Provider -   [x] Specialty/Provider - [facility]    Medication: Junel FE 1/20 1-20 MG-MCG per tab    Dose/Frequency: 1 tablet, Daily     Quantity: 84    Pharmacy: [id number]    Does the patient have enough for 3 days?   [x] Yes   [] No - Send as HP to POD

## 2025-02-27 ENCOUNTER — TELEPHONE (OUTPATIENT)
Dept: OBGYN CLINIC | Facility: CLINIC | Age: 25
End: 2025-02-27

## 2025-02-27 NOTE — TELEPHONE ENCOUNTER
LMOM for pt to call us back to schedule annual exam so that we can send medication refill to provider .

## 2025-03-02 DIAGNOSIS — Z30.8 ENCOUNTER FOR OTHER CONTRACEPTIVE MANAGEMENT: ICD-10-CM

## 2025-03-03 DIAGNOSIS — Z30.8 ENCOUNTER FOR OTHER CONTRACEPTIVE MANAGEMENT: ICD-10-CM

## 2025-03-03 RX ORDER — NORETHINDRONE ACETATE AND ETHINYL ESTRADIOL AND FERROUS FUMARATE 1MG-20(21)
1 KIT ORAL DAILY
Qty: 84 TABLET | Refills: 0 | OUTPATIENT
Start: 2025-03-03

## 2025-03-03 RX ORDER — NORETHINDRONE ACETATE AND ETHINYL ESTRADIOL AND FERROUS FUMARATE 1MG-20(21)
1 KIT ORAL DAILY
Qty: 84 TABLET | Refills: 0 | Status: SHIPPED | OUTPATIENT
Start: 2025-03-03

## 2025-03-03 NOTE — TELEPHONE ENCOUNTER
Reason for call:   [x] Refill   [] Prior Auth  [] Other:     Office: [facility]  [] PCP/Provider -   [x] Specialty/Provider - OBGYN/ Marina Palacio     Medication: Junel FE     Dose/Frequency: 1/20 1-20 mg-mcg/ daily     Quantity: 84 day supply     Pharmacy: [facility] in [place]     Does the patient have enough for 3 days?   [] Yes   [x] No - Send as HP to POD- patient is out completely.

## 2025-03-04 ENCOUNTER — OFFICE VISIT (OUTPATIENT)
Dept: FAMILY MEDICINE CLINIC | Facility: CLINIC | Age: 25
End: 2025-03-04
Payer: COMMERCIAL

## 2025-03-04 VITALS
BODY MASS INDEX: 24.36 KG/M2 | OXYGEN SATURATION: 99 % | WEIGHT: 155.2 LBS | HEART RATE: 77 BPM | HEIGHT: 67 IN | TEMPERATURE: 97.9 F | SYSTOLIC BLOOD PRESSURE: 122 MMHG | DIASTOLIC BLOOD PRESSURE: 72 MMHG

## 2025-03-04 DIAGNOSIS — Z00.00 PHYSICAL EXAM, ANNUAL: Primary | ICD-10-CM

## 2025-03-04 PROCEDURE — 99395 PREV VISIT EST AGE 18-39: CPT | Performed by: FAMILY MEDICINE

## 2025-03-04 NOTE — PROGRESS NOTES
"Name: Chanel Muñoz      : 2000      MRN: [id number]  Encounter Provider: Bernie Nicolas DO  Encounter Date: 3/4/2025   Encounter department: [facility]  :  Assessment & Plan  Physical exam, annual  Check labs  Healthy diet and exercise  Preventively up to date  Orders:    Hemoglobin A1C; Future    Lipid panel; Future    CBC and differential; Future    Comprehensive metabolic panel; Future    TSH, 3rd generation; Future           History of Present Illness   HPI  Pt presents for physical exam.  Exercises regularly.  Sees dental.  Up to date with gyn and imm's.  No concerns    Review of Systems   Constitutional:  Negative for chills, fatigue, fever and unexpected weight change.   HENT:  Negative for congestion, ear pain, hearing loss, postnasal drip, rhinorrhea, sinus pressure, sinus pain, sore throat, trouble swallowing and voice change.    Eyes:  Negative for pain, redness and visual disturbance.   Respiratory:  Negative for cough and shortness of breath.    Cardiovascular:  Negative for chest pain, palpitations and leg swelling.   Gastrointestinal:  Negative for abdominal pain, constipation, diarrhea and nausea.   Endocrine: Negative for cold intolerance, heat intolerance, polydipsia and polyuria.   Genitourinary:  Negative for dysuria, frequency and urgency.   Musculoskeletal:  Negative for arthralgias, joint swelling and myalgias.   Skin:  Negative for rash.        No suspicious lesions   Neurological:  Negative for weakness, numbness and headaches.   Hematological:  Negative for adenopathy.       Objective   /72 (Patient Position: Sitting, Cuff Size: Standard)   Pulse 77   Temp 97.9 °F (36.6 °C) (Temporal)   Ht 5' 7\" (1.702 m)   Wt 70.4 kg (155 lb 3.2 oz)   SpO2 99%   BMI 24.31 kg/m²      Physical Exam  Constitutional:       Appearance: Normal appearance.   HENT:      Head: Normocephalic and atraumatic.      Right Ear: Tympanic membrane, ear canal and external ear " normal.      Left Ear: Tympanic membrane, ear canal and external ear normal.      Nose: Nose normal. No congestion.      Mouth/Throat:      Mouth: Mucous membranes are moist.      Pharynx: No oropharyngeal exudate or posterior oropharyngeal erythema.   Eyes:      Extraocular Movements: Extraocular movements intact.      Conjunctiva/sclera: Conjunctivae normal.      Pupils: Pupils are equal, round, and reactive to light.   Neck:      Vascular: No carotid bruit.   Cardiovascular:      Rate and Rhythm: Normal rate and regular rhythm.      Heart sounds: No murmur heard.     No friction rub. No gallop.   Pulmonary:      Effort: Pulmonary effort is normal.      Breath sounds: No wheezing, rhonchi or rales.   Abdominal:      General: Abdomen is flat. There is no distension.      Palpations: Abdomen is soft.      Tenderness: There is no abdominal tenderness.   Musculoskeletal:      Cervical back: Neck supple.   Lymphadenopathy:      Cervical: No cervical adenopathy.   Neurological:      General: No focal deficit present.      Mental Status: She is alert and oriented to person, place, and time.      Cranial Nerves: No cranial nerve deficit.      Motor: No weakness.      Deep Tendon Reflexes: Reflexes normal.

## 2025-03-19 ENCOUNTER — OFFICE VISIT (OUTPATIENT)
Dept: DERMATOLOGY | Facility: CLINIC | Age: 25
End: 2025-03-19
Payer: COMMERCIAL

## 2025-03-19 VITALS — WEIGHT: 155 LBS | BODY MASS INDEX: 24.33 KG/M2 | HEIGHT: 67 IN | TEMPERATURE: 98.4 F

## 2025-03-19 DIAGNOSIS — R21 RASH: Primary | ICD-10-CM

## 2025-03-19 DIAGNOSIS — D22.9 SPITZ NEVUS: ICD-10-CM

## 2025-03-19 PROCEDURE — 99214 OFFICE O/P EST MOD 30 MIN: CPT | Performed by: NURSE PRACTITIONER

## 2025-03-19 RX ORDER — HYDROCORTISONE 25 MG/G
OINTMENT TOPICAL
Qty: 28 G | Refills: 0 | Status: SHIPPED | OUTPATIENT
Start: 2025-03-19

## 2025-03-19 NOTE — PROGRESS NOTES
"[facility] Clinic Note     Patient Name: Chanel Muñoz  Encounter Date: 03/19/2025     Have you been cared for by a [facility] Dermatologist in the last 3 years and, if so, which description applies to you?    Yes.  I have been here within the last 3 years, and my medical history has NOT changed since that time.  I am FEMALE/of child-bearing potential.    REVIEW OF SYSTEMS:  Have you recently had or currently have any of the following? No changes in my recent health.   PAST MEDICAL HISTORY:  Have you personally ever had or currently have any of the following?  If \"YES,\" then please provide more detail. No changes in my medical history.   HISTORY OF IMMUNOSUPPRESSION: Do you have a history of any of the following:  Systemic Immunosuppression such as Diabetes, Biologic or Immunotherapy, Chemotherapy, Organ Transplantation, Bone Marrow Transplantation or Prednisone?  No     Answering \"YES\" requires the addition of the dotphrase \"IMMUNOSUPPRESSED\" as the first diagnosis of the patient's visit.   FAMILY HISTORY:  Any \"first degree relatives\" (parent, brother, sister, or child) with the following?    No changes in my family's known health.   PATIENT EXPERIENCE:    Do you want the Dermatologist to perform a COMPLETE skin exam today including a clinical examination under the \"bra and underwear\" areas?  NO  If necessary, do we have your permission to call and leave a detailed message on your Preferred Phone number that includes your specific medical information?  Yes      Allergies   Allergen Reactions    Codeine Anaphylaxis     Childhood allergy.      Current Outpatient Medications:     Junel FE 1/20 1-20 MG-MCG per tablet, Take 1 tablet by mouth daily, Disp: 84 tablet, Rfl: 0          Whom besides the patient is providing clinical information about today's encounter?   NO ADDITIONAL HISTORIAN (patient alone provided history)    Physical Exam and Assessment/Plan by Diagnosis:      HISTORY OF MULTIPLE SPITZ NEVUS " "f/u     Physical Exam:  Anatomic Location: Multiple locations in past (reports scanned into media)  Morphologic Description:  Well-circumscribed, dome-shaped, brown papule.   Multiple colors within lesion? No  Multiple lesions? No  Segmental distribution? No  Pertinent Positives:  Pain on palpation? No  Active bleeding? No  Itching? No  Lesion growing? No  Pertinent Negatives:  Additional History of Present Condition: 24 y.o patient here today for a follow up on spot on abdomen.  patient has a history of multiple spitz nevi that have been biopsied and removed; here for routine eval - she denies any changes.    Plan:  We discussed that in children under 12 years old, a spitz nevus will be monitored and the course of the lesion is largely self-limited. When a Spitz nevus first appears, it will typically grow for a few months and then will stop growing and stay the same size, shape, and color. After a few years, spitz nevi sometimes get smaller, flatter, or even disappear. Because of the difficulty in definitively predicting the outcome of a spitz nevus in older children or adults, they are generally excised.  We discussed sun protection measures to help prevent changes in the nevus. Protect the skin when outdoors by wearing hats, long sleeves and pants when possible. When possible, avoid mid-day sun and seek shade. Use sunscreen on exposed skin, and reapply often to prevent sunburns and skin damage.   Continue 6 month skin checks due to hx            PROCEDURES PERFORMED TODAY ASSOCIATED WITH THIS CONDITION:               NONE      Medical Complexity:    CHRONIC ILLNESS (expected duration of >1 year):  STABLE (i.e., AT TREATMENT GOAL). \"Stable\" refers to treatment goals for the individual patient.  A patient not at treatment goal is NOT stable even if the condition is not changing and the patient is asymptomatic because the risk of morbidity without treatment is significant.    RASH    Physical Exam:  (Anatomic " Location); (Size and Morphological Description); (Differential Diagnosis):  Left cheek; pink scaly patch     Additional History of Present Condition:  patient thought she had a pimple on her left cheek about 1-2 weeks ago. She began to pick/scratch it and noticed it is still a bit pink and itchy.     Assessment and Plan:  Based on a thorough discussion of this condition and the management approach to it (including a comprehensive discussion of the known risks, side effects and potential benefits of treatment), the patient (family) agrees to implement the following specific plan:  Hydrocortisone 2.5% ointment to area BID x 7 days  F/u / notify office if no improvement         Scribe Attestation      I,:  Fatuma Ramirez MA am acting as a scribe while in the presence of the attending physician.:       I,:  SHAWN Hernandez personally performed the services described in this documentation    as scribed in my presence.:

## 2025-03-19 NOTE — PATIENT INSTRUCTIONS
"HISTORY OF MULTIPLE SPITZ NEVUS     Plan:  We discussed that in children under 12 years old, a spitz nevus will be monitored and the course of the lesion is largely self-limited. When a Spitz nevus first appears, it will typically grow for a few months and then will stop growing and stay the same size, shape, and color. After a few years, spitz nevi sometimes get smaller, flatter, or even disappear. Because of the difficulty in definitively predicting the outcome of a spitz nevus in older children or adults, they are generally excised.  We discussed sun protection measures to help prevent changes in the nevus. Protect the skin when outdoors by wearing hats, long sleeves and pants when possible. When possible, avoid mid-day sun and seek shade. Use sunscreen on exposed skin, and reapply often to prevent sunburns and skin damage.   Continue 6 month skin checks due to hx            PROCEDURES PERFORMED TODAY ASSOCIATED WITH THIS CONDITION:               NONE      Medical Complexity:    CHRONIC ILLNESS (expected duration of >1 year):  STABLE (i.e., AT TREATMENT GOAL). \"Stable\" refers to treatment goals for the individual patient.  A patient not at treatment goal is NOT stable even if the condition is not changing and the patient is asymptomatic because the risk of morbidity without treatment is significant.  "

## 2025-03-28 ENCOUNTER — TELEPHONE (OUTPATIENT)
Age: 25
End: 2025-03-28

## 2025-03-28 DIAGNOSIS — R21 RASH: Primary | ICD-10-CM

## 2025-03-28 RX ORDER — KETOCONAZOLE 20 MG/G
CREAM TOPICAL DAILY
Qty: 60 G | Refills: 0 | Status: SHIPPED | OUTPATIENT
Start: 2025-03-28

## 2025-03-28 NOTE — TELEPHONE ENCOUNTER
Patient was advised to call in if the prescribed hydrocortisone did not help with her rash. She was seen on 3/19. Patient reports the hydrocortisone helped for a short period of time however it is still present and appears irritated. She is asking if Adal would consider a new prescription. She uses the [facility] in  [facility].

## 2025-04-14 NOTE — PROGRESS NOTES
Assessment & Plan   Problem List Items Addressed This Visit     Well woman exam - Primary   Other Visit Diagnoses       Encounter for other contraceptive management        Relevant Medications    Junel FE 1/20 1-20 MG-MCG per tablet          Discussion    All questions have been answered to her satisfaction  RTO for APE or sooner if needed  Pap deferred.         Subjective     HPI   Chanel Muñoz is a 24 y.o. female who presents for annual well woman exam.     LMP - 24 ; Periods are reg q 28 days and last  few days; No excessive bleeding; No intermenstrual bleeding or spotting; Cramps are tolerable.    No vulvar itch/burn; No vaginal itch/burn; No abn discharge or odor; No urinary sx - burning/pain/frequency/hematuria    No concerning breast masses, asymmetry, nipple discharge or bleeding, changes in skin of breast, or breast tenderness bilaterally    No abd/pelvic pain or HAs;     Pt is sexually active in a mutually monog/ sexual relationship x 6 months; No issues with intercourse; She declines sti/hiv/hep testing; Feels safe at home  Current contraception: OCPS    (+) PCP for routine Bw/care;    Last Pap : 24, WNL  History of abnormal Pap smear: denies     Review of Systems   Constitutional: Negative.    Respiratory: Negative.     Cardiovascular: Negative.    Gastrointestinal: Negative.    Genitourinary: Negative.    Skin: Negative.    Psychiatric/Behavioral: Negative.         The following portions of the patient's history were reviewed and updated as appropriate: allergies, current medications, past family history, past medical history, past social history, past surgical history, and problem list.         OB History        0    Para   0    Term   0       0    AB   0    Living   0       SAB   0    IAB   0    Ectopic   0    Multiple   0    Live Births   0                 Past Medical History:   Diagnosis Date   • Allergic     Codeine and seasonal allergies   • Melanoma (HCC)      toe, elbow chest   • Skin cancer 2013       Past Surgical History:   Procedure Laterality Date   • SKIN BIOPSY  2013   • SKIN LESION EXCISION Right     4th toe       Family History   Problem Relation Age of Onset   • Obesity Mother    • Hypertension Father    • Melanoma Father    • Diabetes Brother         Type 1       Social History     Socioeconomic History   • Marital status: Single     Spouse name: Not on file   • Number of children: Not on file   • Years of education: Not on file   • Highest education level: Not on file   Occupational History   • Not on file   Tobacco Use   • Smoking status: Never     Passive exposure: Never   • Smokeless tobacco: Never   Vaping Use   • Vaping status: Never Used   Substance and Sexual Activity   • Alcohol use: Yes     Alcohol/week: 2.0 standard drinks of alcohol     Comment: social    • Drug use: No   • Sexual activity: Yes     Partners: Male     Birth control/protection: Condom Male, OCP   Other Topics Concern   • Not on file   Social History Narrative    Completing masters in atheletic training     Exercises regularly     Social Drivers of Health     Financial Resource Strain: Low Risk  (2/21/2024)    Overall Financial Resource Strain (CARDIA)    • Difficulty of Paying Living Expenses: Not hard at all   Food Insecurity: No Food Insecurity (2/21/2024)    Nursing - Inadequate Food Risk Classification    • Worried About Running Out of Food in the Last Year: Never true    • Ran Out of Food in the Last Year: Never true    • Ran Out of Food in the Last Year: Not on file   Transportation Needs: No Transportation Needs (2/21/2024)    PRAPARE - Transportation    • Lack of Transportation (Medical): No    • Lack of Transportation (Non-Medical): No   Physical Activity: Sufficiently Active (10/20/2020)    Exercise Vital Sign    • Days of Exercise per Week: 4 days    • Minutes of Exercise per Session: 90 min   Stress: Not on file   Social Connections: Not on file   Intimate Partner  "Violence: Not on file   Housing Stability: Low Risk  (2/21/2024)    Housing Stability Vital Sign    • Unable to Pay for Housing in the Last Year: No    • Number of Times Moved in the Last Year: 1    • Homeless in the Last Year: No         Current Outpatient Medications:   •  hydrocortisone 2.5 % ointment, Apply twice a day to affected area on face for 1 week, Disp: 28 g, Rfl: 0  •  Junel FE 1/20 1-20 MG-MCG per tablet, Take 1 tablet by mouth daily, Disp: 84 tablet, Rfl: 4  •  ketoconazole (NIZORAL) 2 % cream, Apply topically daily Apply topically twice daily to affected area on face for up to 4 weeks., Disp: 60 g, Rfl: 0    Allergies   Allergen Reactions   • Codeine Anaphylaxis     Childhood allergy.       Objective   Vitals:    04/15/25 0721   BP: 128/80   BP Location: Left arm   Patient Position: Sitting   Cuff Size: Standard   Weight: 70.3 kg (155 lb)   Height: 5' 7\" (1.702 m)     Physical Exam  Vitals reviewed.   Constitutional:       Appearance: Normal appearance.   HENT:      Head: Normocephalic and atraumatic.   Cardiovascular:      Rate and Rhythm: Normal rate and regular rhythm.   Pulmonary:      Effort: Pulmonary effort is normal.      Breath sounds: Normal breath sounds.   Chest:   Breasts:     Right: Normal. No swelling, bleeding, inverted nipple, mass, nipple discharge, skin change or tenderness.      Left: Normal. No swelling, bleeding, inverted nipple, mass, nipple discharge, skin change or tenderness.   Abdominal:      General: Abdomen is flat. Bowel sounds are normal.      Palpations: Abdomen is soft.   Genitourinary:     General: Normal vulva.      Labia:         Right: No rash, tenderness, lesion or injury.         Left: No rash, tenderness, lesion or injury.       Urethra: No prolapse, urethral pain, urethral swelling or urethral lesion.      Vagina: Normal.      Cervix: Normal.      Uterus: Normal.       Adnexa: Right adnexa normal and left adnexa normal.   Skin:     General: Skin is warm and " dry.   Neurological:      Mental Status: She is alert.   Psychiatric:         Mood and Affect: Mood normal.         Behavior: Behavior normal.         Thought Content: Thought content normal.         Judgment: Judgment normal.         There are no Patient Instructions on file for this visit.

## 2025-04-15 ENCOUNTER — ANNUAL EXAM (OUTPATIENT)
Dept: OBGYN CLINIC | Facility: CLINIC | Age: 25
End: 2025-04-15
Payer: COMMERCIAL

## 2025-04-15 VITALS
HEIGHT: 67 IN | DIASTOLIC BLOOD PRESSURE: 80 MMHG | BODY MASS INDEX: 24.33 KG/M2 | WEIGHT: 155 LBS | SYSTOLIC BLOOD PRESSURE: 128 MMHG

## 2025-04-15 DIAGNOSIS — Z30.8 ENCOUNTER FOR OTHER CONTRACEPTIVE MANAGEMENT: ICD-10-CM

## 2025-04-15 DIAGNOSIS — Z01.419 WELL WOMAN EXAM: Primary | ICD-10-CM

## 2025-04-15 PROCEDURE — S0612 ANNUAL GYNECOLOGICAL EXAMINA: HCPCS | Performed by: PHYSICIAN ASSISTANT

## 2025-04-15 RX ORDER — NORETHINDRONE ACETATE AND ETHINYL ESTRADIOL AND FERROUS FUMARATE 1MG-20(21)
1 KIT ORAL DAILY
Qty: 84 TABLET | Refills: 4 | Status: SHIPPED | OUTPATIENT
Start: 2025-04-15

## 2025-05-14 ENCOUNTER — NURSE TRIAGE (OUTPATIENT)
Age: 25
End: 2025-05-14

## 2025-05-14 ENCOUNTER — OFFICE VISIT (OUTPATIENT)
Dept: OBGYN CLINIC | Facility: CLINIC | Age: 25
End: 2025-05-14

## 2025-05-14 VITALS
WEIGHT: 155 LBS | BODY MASS INDEX: 24.33 KG/M2 | HEIGHT: 67 IN | DIASTOLIC BLOOD PRESSURE: 74 MMHG | SYSTOLIC BLOOD PRESSURE: 118 MMHG

## 2025-05-14 DIAGNOSIS — N89.8 VAGINAL ITCHING: Primary | ICD-10-CM

## 2025-05-14 DIAGNOSIS — N89.8 DISCHARGE OF VAGINA: ICD-10-CM

## 2025-05-14 DIAGNOSIS — Z11.3 SCREEN FOR STD (SEXUALLY TRANSMITTED DISEASE): ICD-10-CM

## 2025-05-14 PROCEDURE — 87591 N.GONORRHOEAE DNA AMP PROB: CPT | Performed by: PHYSICIAN ASSISTANT

## 2025-05-14 PROCEDURE — 87491 CHLMYD TRACH DNA AMP PROBE: CPT | Performed by: PHYSICIAN ASSISTANT

## 2025-05-14 PROCEDURE — 81514 NFCT DS BV&VAGINITIS DNA ALG: CPT | Performed by: PHYSICIAN ASSISTANT

## 2025-05-14 NOTE — PROGRESS NOTES
Assessment/Plan:      Diagnoses and all orders for this visit:    Vaginal itching  -     Molecular Vaginal Panel  -     Chlamydia/GC amplified DNA by PCR    Discharge of vagina  -     Molecular Vaginal Panel    Screen for STD (sexually transmitted disease)  -     Chlamydia/GC amplified DNA by PCR     24-year-old female presenting today for acute vaginal symptoms as in HPI.  Also okay with STD screening.  She did not taking 1 dose of Diflucan that she had available to her prescribed with an antibiotic previously.  She reports taking the Diflucan with minimal improvement.  On exam there is general mild erythema within the vaginal canal as well as increased amount of thin cloudy discharge slightly yellow also mixed with some clumpy white discharge.  Molecular vaginal panel, GC CT obtained today.  Since patient already treated with Diflucan agreeable to wait on test results to determine further treatment plan.  Continue OCP taken same time every day avoid missing pills.  She did report missing some pills in her pack last month.  She did not get menstruation during placebo week.  She reportedly took home UPT which was negative and does not feel she is pregnant.  She is currently in a new pack and should have placebo week coming up in the next week or 2.  If still no menstruation during placebo week in this pack would recommend another UPT or can reach out to us if any further concern.  Otherwise RTO annual exam when due.    Chief Complaint   Patient presents with    Vaginitis     Vaginal itching and irritation        Subjective:     Patient ID: Chanel Muñoz is a 24 y.o. female.    23y/o female here today for vaginal sxs x 2-3 days.    She reports vaginal itching and irritation. Slight more on outside than inside.   Denies vaginal discharge or odor.  No urinary sxs.  She uses condoms consistent and OCP - monogamous.  OK with STD screening     States she took diflucan and slightly better but still with some sxs.          Review of Systems   Constitutional: Negative.    Gastrointestinal:  Negative for abdominal pain, nausea and vomiting.   Genitourinary:  Positive for menstrual problem (pt notes missed several days in her BC pack last month, no menses during placebo. reports neg home UPT.) and vaginal pain (itching, irritation vulvar and vaginal). Negative for difficulty urinating, dyspareunia, dysuria, frequency, genital sores, hematuria, pelvic pain, urgency, vaginal bleeding and vaginal discharge.         The following portions of the patient's history were reviewed and updated as appropriate: allergies, current medications, past family history, past medical history, past social history, past surgical history, and problem list.      Objective:     Physical Exam  Vitals reviewed.   Constitutional:       Appearance: Normal appearance. She is not ill-appearing.   Genitourinary:     General: Normal vulva.      Labia:         Right: No rash, tenderness or lesion.         Left: No rash, tenderness or lesion.       Vagina: Vaginal discharge (excessive thin cloudy yellow discharge mixed with clumpy white discharge) and erythema present. No tenderness, bleeding or lesions.      Cervix: Normal. No cervical motion tenderness, discharge, friability, lesion, erythema or cervical bleeding.   Lymphadenopathy:      Lower Body: No right inguinal adenopathy. No left inguinal adenopathy.     Neurological:      Mental Status: She is alert and oriented to person, place, and time.     Psychiatric:         Mood and Affect: Mood normal.         Behavior: Behavior normal. Behavior is cooperative.

## 2025-05-14 NOTE — TELEPHONE ENCOUNTER
"FOLLOW UP: Scheduled to be seen by provider tomorrow morning for further evaluation. Care advice provided. Call back if symptoms worsen.     REASON FOR CONVERSATION: Vaginitis    SYMPTOMS: 5/10 internal and external symptoms; Vaginal discomfort.    OTHER: Patient developed internal and external vaginal itching and discomfort a few days ago. Took diflucan she had left from previous use of antibiotics (did not need to use diflucan at the time), which improved symptoms but did not fully resolve. Currently rates itching at 5/10. Denies vaginal discharge, odor, rash or lesions, pelvic pain, abnormal vaginal discharge, burning with urination or fever. Also tried Monistat which did not help with symptoms. RN did recommend patient come in for a evaluation in office. Patient agreeable. Scheduled for tomorrow morning in office. RN provided care advice. Patient aware to call back if symptoms worsen or new symptoms develop. No further questions.     DISPOSITION: See Within 3 Days in Office      Reason for Disposition   Symptoms of a yeast infection (i.e., itchy, white discharge, not bad smelling) and not improved > 3 days following Care Advice    Answer Assessment - Initial Assessment Questions  1. SYMPTOM: \"What's the main symptom you're concerned about?\" (e.g., pain, itching, dryness)      Discomfort, itching, fatigue and dizzy  2. LOCATION: \"Where is the  s/s located?\" (e.g., inside/outside, left/right)      Internal and external  3. ONSET: \"When did the  s/s  start?\"      3 days ago  4. PAIN: \"Is there any pain?\" If Yes, ask: \"How bad is it?\" (Scale: 1-10; mild, moderate, severe)      Discomfort  5. ITCHING: \"Is there any itching?\" If Yes, ask: \"How bad is it?\" (Scale: 1-10; mild, moderate, severe)      5/10  6. CAUSE: \"What do you think is causing the discharge?\" \"Have you had the same problem before?\" \"What happened then?\"      Yeast  7. OTHER SYMPTOMS: \"Do you have any other symptoms?\" (e.g., fever, itching, vaginal " "bleeding, pain with urination, injury to genital area, vaginal foreign body)      Denies  8. PREGNANCY: \"Is there any chance you are pregnant?\" \"When was your last menstrual period?\"      Denies    Protocols used: Vaginal Symptoms-Adult-[place]    "

## 2025-05-15 ENCOUNTER — RESULTS FOLLOW-UP (OUTPATIENT)
Dept: OBGYN CLINIC | Facility: CLINIC | Age: 25
End: 2025-05-15

## 2025-05-15 DIAGNOSIS — B37.31 VAGINAL YEAST INFECTION: Primary | ICD-10-CM

## 2025-05-15 LAB
C GLABRATA DNA VAG QL NAA+PROBE: NEGATIVE
C KRUSEI DNA VAG QL NAA+PROBE: NEGATIVE
C TRACH DNA SPEC QL NAA+PROBE: NEGATIVE
CANDIDA SP 6 PNL VAG NAA+PROBE: POSITIVE
N GONORRHOEA DNA SPEC QL NAA+PROBE: NEGATIVE
T VAGINALIS DNA VAG QL NAA+PROBE: NEGATIVE
VAGINOSIS/ITIS DNA PNL VAG PROBE+SIG AMP: NEGATIVE

## 2025-05-15 RX ORDER — FLUCONAZOLE 150 MG/1
150 TABLET ORAL
Qty: 2 TABLET | Refills: 0 | Status: SHIPPED | OUTPATIENT
Start: 2025-05-15 | End: 2025-05-19

## 2025-05-29 ENCOUNTER — OFFICE VISIT (OUTPATIENT)
Dept: URGENT CARE | Facility: CLINIC | Age: 25
End: 2025-05-29
Payer: COMMERCIAL

## 2025-05-29 VITALS
WEIGHT: 150 LBS | HEART RATE: 88 BPM | TEMPERATURE: 98.4 F | BODY MASS INDEX: 23.54 KG/M2 | SYSTOLIC BLOOD PRESSURE: 118 MMHG | OXYGEN SATURATION: 99 % | DIASTOLIC BLOOD PRESSURE: 76 MMHG | HEIGHT: 67 IN | RESPIRATION RATE: 18 BRPM

## 2025-05-29 DIAGNOSIS — R05.1 ACUTE COUGH: ICD-10-CM

## 2025-05-29 DIAGNOSIS — J02.9 SORE THROAT: Primary | ICD-10-CM

## 2025-05-29 LAB — S PYO AG THROAT QL: NEGATIVE

## 2025-05-29 PROCEDURE — 87880 STREP A ASSAY W/OPTIC: CPT | Performed by: PHYSICIAN ASSISTANT

## 2025-05-29 PROCEDURE — 99213 OFFICE O/P EST LOW 20 MIN: CPT | Performed by: PHYSICIAN ASSISTANT

## 2025-05-29 RX ORDER — LIDOCAINE HYDROCHLORIDE 20 MG/ML
15 SOLUTION OROPHARYNGEAL 4 TIMES DAILY PRN
Qty: 120 ML | Refills: 0 | Status: SHIPPED | OUTPATIENT
Start: 2025-05-29 | End: 2025-06-04

## 2025-05-29 RX ORDER — BENZONATATE 100 MG/1
100 CAPSULE ORAL 3 TIMES DAILY PRN
Qty: 30 CAPSULE | Refills: 0 | Status: SHIPPED | OUTPATIENT
Start: 2025-05-29 | End: 2025-06-04

## 2025-05-29 NOTE — PATIENT INSTRUCTIONS
Patient Education     Sore throat in adults   The Basics   Written by the doctors and editors at [facility]   What causes sore throat? -- Sore throat is usually caused by an infection. Two types of germs can cause it: viruses and bacteria.  People who have a sore throat caused by a virus do not usually need to see a doctor or nurse. But if you think that you might have been exposed to COVID-19, or if COVID-19 is common where you live, ask your doctor or nurse if you should be tested.  People who have a sore throat caused by bacteria might need to see a doctor or nurse. They might have a type of infection called strep throat. Only about 1 in 10 adults who seek medical care for sore throat have strep throat.  How can I tell if my sore throat is caused by a virus or strep throat? -- It is hard to tell the difference. But there are some clues to look for.  People who have a sore throat caused by a virus usually have other symptoms, such as:   Stuffy or runny nose   Itchy or red eyes   Cough  People who have COVID-19 can have a sore throat as their only symptom. Or they can have other symptoms such as fever, cough, trouble breathing, and problems with their sense of smell or taste. In most cases, the only way to know for sure if you have COVID-19 is to get tested.  People who have strep throat do not usually have a cough, runny nose, or itchy or red eyes. They might have:   Severe throat pain   Fever (temperature higher than 100.4°F or 38°C)   Swollen glands in the neck  If you think that you have strep throat, the doctor or nurse can easily check. They can take a sample from the back of your throat and test it for the bacteria that cause strep throat.  Do I need antibiotics? -- If you have an infection caused by a virus, you do not need antibiotics. But if you have strep throat, you should get antibiotics. Most people with strep throat get better without antibiotics, but doctors and nurses often prescribe them. This is  "because antibiotics often can prevent other problems that might be caused by strep throat. Plus, antibiotics can reduce the symptoms of strep throat and prevent you from spreading it to other people.  What can I do to feel better? -- To relieve the pain of sore throat, you can:   Take over-the-counter pain medicine - Acetaminophen (sample brand name: Tylenol) or ibuprofen (sample brand names: Advil, Motrin) can help with throat pain.   Use sore throat lozenges or sprays - Using medicated sore throat lozenges or throat sprays can temporarily reduce throat pain.   Suck on hard candies, ice chips, or ice pops.   Gargle with salt water - Some people find that this helps with throat pain.   Use a cool mist humidifier - This adds moisture to the air to keep the throat from getting too dry and might help with pain.   Avoid smoking or being around people who are smoking - Smoke can make throat pain worse.  When can I go back to work or school? -- If you have strep throat, wait 1 day after starting antibiotics. By then, you will be a lot less likely to spread the infection to others.  If you have COVID-19, stay home from work or school and \"self-isolate\" until your doctor or nurse tells you it's safe to stop. Self-isolation means staying apart from other people, even the people you live with. When you can stop self-isolation depends on how long it has been since you had symptoms, and in some cases, whether you have had a negative test (showing that the virus is no longer in your body).  If you have a sore throat that is not due to strep throat or COVID-19, you can go back to your usual activities as soon as you feel well. But it's still important to wash your hands often and cover your mouth if you cough.  When should I call the doctor? -- Call your doctor or nurse if:   You have a fever of at least 101°F (38.4°C).   Your throat pain is severe within the first 2 days, or does not start to improve within 5 to 7 days.   You " are having trouble getting enough to eat or drink.   You got antibiotics but still have symptoms after finishing them.  Call for an ambulance (in the [place] and [place], call 9-1-1) or go to the emergency department if you:   Have trouble breathing   Are drooling because you cannot swallow your saliva   Have swelling of the neck or tongue   Cannot move your neck, or have trouble opening your mouth  What can I do to prevent getting a sore throat again? -- Wash your hands often with soap and water (figure 1). It is one of the best ways to prevent the spread of infection.  All topics are updated as new evidence becomes available and our peer review process is complete.  This topic retrieved from [employer] on: Mar 13, 2024.  Topic 92828 Version 23.0  Release: 32.2.4 - C32.71  © 2024 UpToDate, Inc. and/or its affiliates. All rights reserved.  figure 1: How to wash your hands     Wet your hands with clean water, and apply a small amount of soap. Lather and rub hands together for at least 20 seconds. Clean your wrists, palms, backs of your hands, between your fingers, tips of your fingers, thumbs, and under and around your nails. Rinse well, and dry your hands using a clean towel.  Graphic 220699 Version 7.0  Consumer Information Use and Disclaimer   Disclaimer: This generalized information is a limited summary of diagnosis, treatment, and/or medication information. It is not meant to be comprehensive and should be used as a tool to help the user understand and/or assess potential diagnostic and treatment options. It does NOT include all information about conditions, treatments, medications, side effects, or risks that may apply to a specific patient. It is not intended to be medical advice or a substitute for the medical advice, diagnosis, or treatment of a health care provider based on the health care provider's examination and assessment of a patient's specific and unique circumstances. Patients must speak with a health care  provider for complete information about their health, medical questions, and treatment options, including any risks or benefits regarding use of medications. This information does not endorse any treatments or medications as safe, effective, or approved for treating a specific patient. UpToDate, Inc. and its affiliates disclaim any warranty or liability relating to this information or the use thereof.The use of this information is governed by the Terms of Use, available at https://www.woltersIntenseuwer.com/en/know/clinical-effectiveness-terms. 2024© UpToDate, Inc. and its affiliates and/or licensors. All rights reserved.  Copyright   © 2024 Up[employer]Date, Inc. and/or its affiliates. All rights reserved.

## 2025-05-29 NOTE — PROGRESS NOTES
[facility]        NAME: Chanel Muñoz is a 24 y.o. female  : 2000    MRN: [id number]  DATE: May 29, 2025  TIME: 9:47 AM    Assessment and Plan   Sore throat [J02.9]  1. Sore throat  POCT rapid ANTIGEN strepA    Lidocaine Viscous HCl (XYLOCAINE) 2 % mucosal solution      2. Acute cough  benzonatate (TESSALON PERLES) 100 mg capsule        - rapid strep test. Viscous lido as needed for sore throat. Tessalon for cough.     Patient Instructions     Patient Instructions   Patient Education     Sore throat in adults   The Basics   Written by the doctors and editors at [facility]   What causes sore throat? -- Sore throat is usually caused by an infection. Two types of germs can cause it: viruses and bacteria.  People who have a sore throat caused by a virus do not usually need to see a doctor or nurse. But if you think that you might have been exposed to COVID-19, or if COVID-19 is common where you live, ask your doctor or nurse if you should be tested.  People who have a sore throat caused by bacteria might need to see a doctor or nurse. They might have a type of infection called strep throat. Only about 1 in 10 adults who seek medical care for sore throat have strep throat.  How can I tell if my sore throat is caused by a virus or strep throat? -- It is hard to tell the difference. But there are some clues to look for.  People who have a sore throat caused by a virus usually have other symptoms, such as:   Stuffy or runny nose   Itchy or red eyes   Cough  People who have COVID-19 can have a sore throat as their only symptom. Or they can have other symptoms such as fever, cough, trouble breathing, and problems with their sense of smell or taste. In most cases, the only way to know for sure if you have COVID-19 is to get tested.  People who have strep throat do not usually have a cough, runny nose, or itchy or red eyes. They might have:   Severe throat pain   Fever (temperature higher than 100.4°F or 38°C)    "Swollen glands in the neck  If you think that you have strep throat, the doctor or nurse can easily check. They can take a sample from the back of your throat and test it for the bacteria that cause strep throat.  Do I need antibiotics? -- If you have an infection caused by a virus, you do not need antibiotics. But if you have strep throat, you should get antibiotics. Most people with strep throat get better without antibiotics, but doctors and nurses often prescribe them. This is because antibiotics often can prevent other problems that might be caused by strep throat. Plus, antibiotics can reduce the symptoms of strep throat and prevent you from spreading it to other people.  What can I do to feel better? -- To relieve the pain of sore throat, you can:   Take over-the-counter pain medicine - Acetaminophen (sample brand name: Tylenol) or ibuprofen (sample brand names: Advil, Motrin) can help with throat pain.   Use sore throat lozenges or sprays - Using medicated sore throat lozenges or throat sprays can temporarily reduce throat pain.   Suck on hard candies, ice chips, or ice pops.   Gargle with salt water - Some people find that this helps with throat pain.   Use a cool mist humidifier - This adds moisture to the air to keep the throat from getting too dry and might help with pain.   Avoid smoking or being around people who are smoking - Smoke can make throat pain worse.  When can I go back to work or school? -- If you have strep throat, wait 1 day after starting antibiotics. By then, you will be a lot less likely to spread the infection to others.  If you have COVID-19, stay home from work or school and \"self-isolate\" until your doctor or nurse tells you it's safe to stop. Self-isolation means staying apart from other people, even the people you live with. When you can stop self-isolation depends on how long it has been since you had symptoms, and in some cases, whether you have had a negative test (showing that " the virus is no longer in your body).  If you have a sore throat that is not due to strep throat or COVID-19, you can go back to your usual activities as soon as you feel well. But it's still important to wash your hands often and cover your mouth if you cough.  When should I call the doctor? -- Call your doctor or nurse if:   You have a fever of at least 101°F (38.4°C).   Your throat pain is severe within the first 2 days, or does not start to improve within 5 to 7 days.   You are having trouble getting enough to eat or drink.   You got antibiotics but still have symptoms after finishing them.  Call for an ambulance (in the [place] and [place], call 9-1-1) or go to the emergency department if you:   Have trouble breathing   Are drooling because you cannot swallow your saliva   Have swelling of the neck or tongue   Cannot move your neck, or have trouble opening your mouth  What can I do to prevent getting a sore throat again? -- Wash your hands often with soap and water (figure 1). It is one of the best ways to prevent the spread of infection.  All topics are updated as new evidence becomes available and our peer review process is complete.  This topic retrieved from [employer] on: Mar 13, 2024.  Topic 26462 Version 23.0  Release: 32.2.4 - C32.71  © 2024 UpToDate, Inc. and/or its affiliates. All rights reserved.  figure 1: How to wash your hands     Wet your hands with clean water, and apply a small amount of soap. Lather and rub hands together for at least 20 seconds. Clean your wrists, palms, backs of your hands, between your fingers, tips of your fingers, thumbs, and under and around your nails. Rinse well, and dry your hands using a clean towel.  Graphic 172214 Version 7.0  Consumer Information Use and Disclaimer   Disclaimer: This generalized information is a limited summary of diagnosis, treatment, and/or medication information. It is not meant to be comprehensive and should be used as a tool to help the user  understand and/or assess potential diagnostic and treatment options. It does NOT include all information about conditions, treatments, medications, side effects, or risks that may apply to a specific patient. It is not intended to be medical advice or a substitute for the medical advice, diagnosis, or treatment of a health care provider based on the health care provider's examination and assessment of a patient's specific and unique circumstances. Patients must speak with a health care provider for complete information about their health, medical questions, and treatment options, including any risks or benefits regarding use of medications. This information does not endorse any treatments or medications as safe, effective, or approved for treating a specific patient. UpToDate, Inc. and its affiliates disclaim any warranty or liability relating to this information or the use thereof.The use of this information is governed by the Terms of Use, available at https://www.[employer]/en/know/clinical-effectiveness-terms. 2024© UpToDate, Inc. and its affiliates and/or licensors. All rights reserved.  Copyright   © 2024 UpToDate, Inc. and/or its affiliates. All rights reserved.        Follow up with PCP in 3-5 days.  Proceed to  ER if symptoms worsen.    Chief Complaint     Chief Complaint   Patient presents with    Sore Throat     Sore throat for 4 days with cough and fever. Taking motrin          History of Present Illness       The patient is a 24-year-old female with a hx of allergies presenting today for sore throat x 4 days.  Also reports a cough and fevers.  Has tried Motrin which seems to help.  Tmax was about 100 F.        Review of Systems   Review of Systems   Constitutional:  Positive for fever. Negative for activity change, appetite change, chills and fatigue.   HENT:  Positive for sore throat. Negative for congestion, ear pain, rhinorrhea, sinus pressure and sinus pain.    Eyes:  Negative for pain and  "visual disturbance.   Respiratory:  Positive for cough. Negative for chest tightness and shortness of breath.    Cardiovascular:  Negative for chest pain and palpitations.   Gastrointestinal:  Negative for abdominal pain, diarrhea, nausea and vomiting.   Genitourinary:  Negative for dysuria and hematuria.   Musculoskeletal:  Negative for arthralgias, back pain and myalgias.   Skin:  Negative for color change, pallor and rash.   Neurological:  Negative for seizures, syncope and headaches.   All other systems reviewed and are negative.        Current Medications     Current Medications[1]    Current Allergies     Allergies as of 05/29/2025 - Reviewed 05/29/2025   Allergen Reaction Noted    Codeine Anaphylaxis 11/06/2023            The following portions of the patient's history were reviewed and updated as appropriate: allergies, current medications, past family history, past medical history, past social history, past surgical history and problem list.     Past Medical History[2]    Past Surgical History[3]    Family History[4]      Medications have been verified.        Objective   /76   Pulse 88   Temp 98.4 °F (36.9 °C)   Resp 18   Ht 5' 7\" (1.702 m)   Wt 68 kg (150 lb)   SpO2 99%   BMI 23.49 kg/m²        Physical Exam     Physical Exam  Vitals reviewed.   Constitutional:       General: She is not in acute distress.     Appearance: Normal appearance. She is well-developed and normal weight. She is not ill-appearing, toxic-appearing or diaphoretic.   HENT:      Head: Normocephalic and atraumatic.      Right Ear: Tympanic membrane and ear canal normal. No drainage, swelling or tenderness. No middle ear effusion. Tympanic membrane is not erythematous.      Left Ear: Tympanic membrane and ear canal normal. No drainage, swelling or tenderness.  No middle ear effusion. Tympanic membrane is not erythematous.      Nose: No congestion or rhinorrhea.      Mouth/Throat:      Mouth: Mucous membranes are moist. No " oral lesions.      Pharynx: Oropharynx is clear. Uvula midline. Posterior oropharyngeal erythema present. No pharyngeal swelling, oropharyngeal exudate or uvula swelling.      Tonsils: No tonsillar exudate or tonsillar abscesses. 0 on the right. 0 on the left.     Eyes:      Extraocular Movements:      Right eye: Normal extraocular motion.      Left eye: Normal extraocular motion.      Conjunctiva/sclera: Conjunctivae normal.      Pupils: Pupils are equal, round, and reactive to light.       Cardiovascular:      Rate and Rhythm: Normal rate and regular rhythm.      Heart sounds: Normal heart sounds. No murmur heard.     No friction rub. No gallop.   Pulmonary:      Effort: Pulmonary effort is normal. No respiratory distress.      Breath sounds: Normal breath sounds. No stridor. No wheezing, rhonchi or rales.   Chest:      Chest wall: No tenderness.   Abdominal:      General: Abdomen is flat. Bowel sounds are normal. There is no distension.      Palpations: Abdomen is soft.      Tenderness: There is no abdominal tenderness. There is no guarding.     Musculoskeletal:         General: Normal range of motion.     Skin:     General: Skin is warm and dry.      Capillary Refill: Capillary refill takes less than 2 seconds.     Neurological:      Mental Status: She is alert.                        [1]   Current Outpatient Medications:     benzonatate (TESSALON PERLES) 100 mg capsule, Take 1 capsule (100 mg total) by mouth 3 (three) times a day as needed for cough, Disp: 30 capsule, Rfl: 0    hydrocortisone 2.5 % ointment, Apply twice a day to affected area on face for 1 week, Disp: 28 g, Rfl: 0    Junel FE 1/20 1-20 MG-MCG per tablet, Take 1 tablet by mouth daily, Disp: 84 tablet, Rfl: 4    ketoconazole (NIZORAL) 2 % cream, Apply topically daily Apply topically twice daily to affected area on face for up to 4 weeks., Disp: 60 g, Rfl: 0    Lidocaine Viscous HCl (XYLOCAINE) 2 % mucosal solution, Swish and spit 15 mL 4 (four)  times a day as needed for mouth pain or discomfort, Disp: 120 mL, Rfl: 0  [2]   Past Medical History:  Diagnosis Date    Allergic     Codeine and seasonal allergies    Melanoma (HCC)     toe, elbow chest    Skin cancer 2013   [3]   Past Surgical History:  Procedure Laterality Date    SKIN BIOPSY  2013    SKIN LESION EXCISION Right     4th toe   [4]   Family History  Problem Relation Name Age of Onset    Obesity Mother Shannan Toni     Hypertension Father Toro     Melanoma Father Toro     Diabetes Brother Shahbaz         Type 1

## 2025-06-04 ENCOUNTER — OFFICE VISIT (OUTPATIENT)
Dept: FAMILY MEDICINE CLINIC | Facility: CLINIC | Age: 25
End: 2025-06-04
Payer: COMMERCIAL

## 2025-06-04 VITALS
OXYGEN SATURATION: 99 % | HEIGHT: 67 IN | SYSTOLIC BLOOD PRESSURE: 118 MMHG | WEIGHT: 151.6 LBS | TEMPERATURE: 97.7 F | BODY MASS INDEX: 23.79 KG/M2 | HEART RATE: 82 BPM | DIASTOLIC BLOOD PRESSURE: 66 MMHG

## 2025-06-04 DIAGNOSIS — R10.84 GENERALIZED ABDOMINAL PAIN: Primary | ICD-10-CM

## 2025-06-04 PROCEDURE — 99214 OFFICE O/P EST MOD 30 MIN: CPT | Performed by: FAMILY MEDICINE

## 2025-06-04 RX ORDER — OMEPRAZOLE 20 MG/1
20 CAPSULE, DELAYED RELEASE ORAL DAILY
Qty: 30 CAPSULE | Refills: 1 | Status: SHIPPED | OUTPATIENT
Start: 2025-06-04

## 2025-06-04 NOTE — PROGRESS NOTES
"Name: Chanel Muñoz      : 2000      MRN: [id number]  Encounter Provider: Bernie Nicolas DO  Encounter Date: 2025   Encounter department: [facility]  :  Assessment & Plan  Generalized abdominal pain  Obtain celiac panel  Obtain labs ordered at last appt  US abd  Start omeprazole  F/u 3 weeks  Orders:    Celiac Panel/Adult; Future    US abdomen complete; Future    omeprazole (PriLOSEC) 20 mg delayed release capsule; Take 1 capsule (20 mg total) by mouth daily           History of Present Illness   HPI  Pt c/o bloating, pain in upper abd, nausea.  Worse after eating and sometimes when waking.  Pain is described as \"hard and heavy, gnawing.\"  Sometimes stays for days and sometimes away but can't tell what makes it go away.  Occasional diarrhea and constipation.    Review of Systems   Constitutional:  Negative for chills, fatigue, fever and unexpected weight change.   HENT:  Negative for congestion, ear pain, hearing loss, postnasal drip, rhinorrhea, sinus pressure, sinus pain, sore throat, trouble swallowing and voice change.    Eyes:  Negative for pain, redness and visual disturbance.   Respiratory:  Negative for cough and shortness of breath.    Cardiovascular:  Negative for chest pain, palpitations and leg swelling.   Gastrointestinal:  Negative for abdominal pain, constipation, diarrhea and nausea.   Endocrine: Negative for cold intolerance, heat intolerance, polydipsia and polyuria.   Genitourinary:  Negative for dysuria, frequency and urgency.   Musculoskeletal:  Negative for arthralgias, joint swelling and myalgias.   Skin:  Negative for rash.        No suspicious lesions   Neurological:  Negative for weakness, numbness and headaches.   Hematological:  Negative for adenopathy.       Objective   /66 (Patient Position: Sitting, Cuff Size: Standard)   Pulse 82   Temp 97.7 °F (36.5 °C) (Temporal)   Ht 5' 7\" (1.702 m)   Wt 68.8 kg (151 lb 9.6 oz)   SpO2 99%   BMI 23.74 " kg/m²      Physical Exam  Constitutional:       Appearance: Normal appearance.   HENT:      Head: Normocephalic and atraumatic.      Right Ear: Tympanic membrane, ear canal and external ear normal.      Left Ear: Tympanic membrane, ear canal and external ear normal.      Nose: Nose normal. No congestion.      Mouth/Throat:      Mouth: Mucous membranes are moist.      Pharynx: No oropharyngeal exudate or posterior oropharyngeal erythema.     Eyes:      Extraocular Movements: Extraocular movements intact.      Conjunctiva/sclera: Conjunctivae normal.      Pupils: Pupils are equal, round, and reactive to light.     Neck:      Vascular: No carotid bruit.     Cardiovascular:      Rate and Rhythm: Normal rate and regular rhythm.      Heart sounds: No murmur heard.     No friction rub. No gallop.   Pulmonary:      Effort: Pulmonary effort is normal.      Breath sounds: No wheezing, rhonchi or rales.   Abdominal:      General: Abdomen is flat. There is no distension.      Palpations: Abdomen is soft.      Tenderness: There is no abdominal tenderness.     Musculoskeletal:      Cervical back: Neck supple.   Lymphadenopathy:      Cervical: No cervical adenopathy.     Neurological:      General: No focal deficit present.      Mental Status: She is alert and oriented to person, place, and time.      Cranial Nerves: No cranial nerve deficit.      Motor: No weakness.      Deep Tendon Reflexes: Reflexes normal.

## 2025-06-06 LAB
ALBUMIN SERPL-MCNC: 4.3 G/DL (ref 4–5)
ALP SERPL-CCNC: 54 IU/L (ref 44–121)
ALT SERPL-CCNC: 14 IU/L (ref 0–32)
AST SERPL-CCNC: 18 IU/L (ref 0–40)
BASOPHILS # BLD AUTO: 0.1 X10E3/UL (ref 0–0.2)
BASOPHILS NFR BLD AUTO: 1 %
BILIRUB SERPL-MCNC: <0.2 MG/DL (ref 0–1.2)
BUN SERPL-MCNC: 8 MG/DL (ref 6–20)
BUN/CREAT SERPL: 11 (ref 9–23)
CALCIUM SERPL-MCNC: 9.7 MG/DL (ref 8.7–10.2)
CHLORIDE SERPL-SCNC: 105 MMOL/L (ref 96–106)
CHOLEST SERPL-MCNC: 186 MG/DL (ref 100–199)
CHOLEST/HDLC SERPL: 3 RATIO (ref 0–4.4)
CO2 SERPL-SCNC: 20 MMOL/L (ref 20–29)
CREAT SERPL-MCNC: 0.75 MG/DL (ref 0.57–1)
EGFR: 113 ML/MIN/1.73
ENDOMYSIUM IGA SER QL: NEGATIVE
EOSINOPHIL # BLD AUTO: 0.2 X10E3/UL (ref 0–0.4)
EOSINOPHIL NFR BLD AUTO: 2 %
ERYTHROCYTE [DISTWIDTH] IN BLOOD BY AUTOMATED COUNT: 11.9 % (ref 11.7–15.4)
EST. AVERAGE GLUCOSE BLD GHB EST-MCNC: 97 MG/DL
GLOBULIN SER-MCNC: 2.6 G/DL (ref 1.5–4.5)
GLUCOSE SERPL-MCNC: 87 MG/DL (ref 70–99)
HBA1C MFR BLD: 5 % (ref 4.8–5.6)
HCT VFR BLD AUTO: 42.9 % (ref 34–46.6)
HDLC SERPL-MCNC: 62 MG/DL
HGB BLD-MCNC: 14.3 G/DL (ref 11.1–15.9)
IGA SERPL-MCNC: 103 MG/DL (ref 87–352)
IGA SERPL-MCNC: 110 MG/DL (ref 87–352)
IMM GRANULOCYTES # BLD: 0 X10E3/UL (ref 0–0.1)
IMM GRANULOCYTES NFR BLD: 0 %
LDLC SERPL CALC-MCNC: 103 MG/DL (ref 0–99)
LYMPHOCYTES # BLD AUTO: 2.1 X10E3/UL (ref 0.7–3.1)
LYMPHOCYTES NFR BLD AUTO: 33 %
MCH RBC QN AUTO: 29.4 PG (ref 26.6–33)
MCHC RBC AUTO-ENTMCNC: 33.3 G/DL (ref 31.5–35.7)
MCV RBC AUTO: 88 FL (ref 79–97)
MONOCYTES # BLD AUTO: 0.6 X10E3/UL (ref 0.1–0.9)
MONOCYTES NFR BLD AUTO: 9 %
NEUTROPHILS # BLD AUTO: 3.6 X10E3/UL (ref 1.4–7)
NEUTROPHILS NFR BLD AUTO: 55 %
PLATELET # BLD AUTO: 281 X10E3/UL (ref 150–450)
POTASSIUM SERPL-SCNC: 4.5 MMOL/L (ref 3.5–5.2)
PROT SERPL-MCNC: 6.9 G/DL (ref 6–8.5)
RBC # BLD AUTO: 4.87 X10E6/UL (ref 3.77–5.28)
SL AMB VLDL CHOLESTEROL CALC: 21 MG/DL (ref 5–40)
SODIUM SERPL-SCNC: 140 MMOL/L (ref 134–144)
TRIGL SERPL-MCNC: 118 MG/DL (ref 0–149)
TSH SERPL DL<=0.005 MIU/L-ACNC: 1.62 UIU/ML (ref 0.45–4.5)
TTG IGA SER-ACNC: <2 U/ML (ref 0–3)
TTG IGA SER-ACNC: <2 U/ML (ref 0–3)
WBC # BLD AUTO: 6.5 X10E3/UL (ref 3.4–10.8)

## 2025-06-09 ENCOUNTER — RESULTS FOLLOW-UP (OUTPATIENT)
Dept: FAMILY MEDICINE CLINIC | Facility: CLINIC | Age: 25
End: 2025-06-09

## 2025-06-09 ENCOUNTER — HOSPITAL ENCOUNTER (OUTPATIENT)
Dept: ULTRASOUND IMAGING | Facility: HOSPITAL | Age: 25
Discharge: HOME | End: 2025-06-09
Payer: COMMERCIAL

## 2025-06-09 DIAGNOSIS — R10.84 GENERALIZED ABDOMINAL PAIN: ICD-10-CM

## 2025-06-09 PROCEDURE — 76700 US EXAM ABDOM COMPLETE: CPT

## 2025-06-11 ENCOUNTER — NURSE TRIAGE (OUTPATIENT)
Age: 25
End: 2025-06-11

## 2025-06-11 DIAGNOSIS — B37.31 VAGINAL YEAST INFECTION: Primary | ICD-10-CM

## 2025-06-11 RX ORDER — FLUCONAZOLE 150 MG/1
150 TABLET ORAL
Qty: 3 TABLET | Refills: 0 | Status: SHIPPED | OUTPATIENT
Start: 2025-06-11 | End: 2025-06-18

## 2025-06-11 NOTE — TELEPHONE ENCOUNTER
"REASON FOR CONVERSATION: Vaginal Itching    SYMPTOMS: white discharge and vaginal itching    OTHER HEALTH INFORMATION: treated for yeast infection last month prior to period. Again she is due for period in 5 days and started with symptoms today. Had to take 3 doses of diflucan last month. Patient requesting 3 doses.     PROTOCOL DISPOSITION: Discuss with Provider and Call Back Patient (overriding See Within 3 Days in Office)    CARE ADVICE PROVIDED: genital hygiene, OTC monistat, advised to call If this becomes persistent.     PRACTICE FOLLOW-UP: n/a      Reason for Disposition   Vaginal itching and not improved > 3 days following Care Advice    Answer Assessment - Initial Assessment Questions  1. SYMPTOM: \"What's the main symptom you're concerned about?\" (e.g., pain, itching, dryness)      Vaginal itching and white discharge  2. LOCATION: \"Where is the  itching located?\" (e.g., inside/outside, left/right)      Only outside of vagina  3. ONSET: \"When did the  symptoms  start?\"      today  4. PAIN: \"Is there any pain?\" If Yes, ask: \"How bad is it?\" (Scale: 1-10; mild, moderate, severe)      denies  5. ITCHING: \"Is there any itching?\" If Yes, ask: \"How bad is it?\" (Scale: 1-10; mild, moderate, severe)      2-3/10  6. CAUSE: \"What do you think is causing the discharge?\" \"Have you had the same problem before?\" \"What happened then?\"      Yeast infection  7. OTHER SYMPTOMS: \"Do you have any other symptoms?\" (e.g., fever, itching, vaginal bleeding, pain with urination, injury to genital area, vaginal foreign body)      denies  8. PREGNANCY: \"Is there any chance you are pregnant?\" \"When was your last menstrual period?\"      LMP about 4 weeks ago on OCP    Protocols used: Vaginal Symptoms-Adult-[place]    "

## 2025-06-11 NOTE — TELEPHONE ENCOUNTER
Called patient and reviewed providers recommendations and medication orders.  No further questions or concerns.

## 2025-06-11 NOTE — TELEPHONE ENCOUNTER
Please let pt know that I sent in 3 doses of fluconazole for her today at her request. If there is a pattern with the recurrent symptoms, I question of the oral BC pill may have any influence. Sometimes estrogen from the OCP could disrupt vaginal johnny/bacteria making some patients more susceptible to yeast infections. Would advise continued monitoring for recurrence, unfortunately only way to tell if from OCP is to d/c for a few months and see if pattern stops.

## 2025-06-16 ENCOUNTER — RESULTS FOLLOW-UP (OUTPATIENT)
Dept: FAMILY MEDICINE CLINIC | Facility: CLINIC | Age: 25
End: 2025-06-16

## 2025-06-18 ENCOUNTER — TELEPHONE (OUTPATIENT)
Dept: DERMATOLOGY | Facility: CLINIC | Age: 25
End: 2025-06-18

## 2025-06-18 NOTE — TELEPHONE ENCOUNTER
6 month spitz nevis///R/S from 9/19 Adal appt, LVM advising new date and to callback to confirm or to R/S    inr 3.2 same dose recheck 2 weeks  Also let patient know cologuard is negative

## 2025-06-19 DIAGNOSIS — B37.31 VAGINAL YEAST INFECTION: Primary | ICD-10-CM

## 2025-06-19 RX ORDER — TERCONAZOLE 4 MG/G
1 CREAM VAGINAL
Qty: 45 G | Refills: 0 | Status: SHIPPED | OUTPATIENT
Start: 2025-06-19 | End: 2025-06-26

## 2025-06-19 NOTE — TELEPHONE ENCOUNTER
Please call patient, unsure as to why she is not responding to the 3 doses of the fluconazole.  Unclear if there could be some resistance of the fluconazole.  Therefore we will try a 7-day vaginal cream suppository terconazole sent to pharmacy on file today she will insert 1 suppository of the cream into the vagina every night before bed for 7 nights, would recommend avoiding any intercourse during this time.  If symptoms persist after this treatment would recommend follow-up for reculture in the office. Prescription sent electronically to pharmacy on file this afternoon.  Thank you

## 2025-06-19 NOTE — TELEPHONE ENCOUNTER
Pt calling in saying that she has symptoms of a yeast infection. Pt saying she has white discharge and internal vaginal itching. Pt saying she took 3 doses of diflucan as ordered and it isnt relieving the symptoms. Pt saying she took diflucan on 6/11, 6/14 and 6/17. Pt is notified hte medication says in your system for 3 days, and since pt took the last dose on 17th, a message will be sent to provider for further recommendations, pt verbalized understanding. Pt is advised to call back if her symptoms worsen or if she has further questions, Pt verbalized understanding, no further questions at this time'

## 2025-06-19 NOTE — TELEPHONE ENCOUNTER
Phone call to patient and reviewed provider note and recommendations, patient verbalzied understanding and will  new script.   Patient verbalized frustration as this has happened 2 month in a row. She changed to unscented body soap and purchased organic sanitary pads for period. We discussed hygiene for her and partner before intimacy. Advised open to air at bedtime, avoid perfume soaps and feminine products, recommend cotton underwear only, no thongs, wear looser fit clothing, change out of wet clothing after exercise and/or bathing suits ASAP.  Call back if symmptoms do not improve or any other questions or concerns, patient verbalzied understanding and very thankful to Maria Victoria for follow up.

## 2025-06-25 ENCOUNTER — OFFICE VISIT (OUTPATIENT)
Dept: FAMILY MEDICINE CLINIC | Facility: CLINIC | Age: 25
End: 2025-06-25
Payer: COMMERCIAL

## 2025-06-25 VITALS
TEMPERATURE: 97 F | HEART RATE: 85 BPM | RESPIRATION RATE: 16 BRPM | WEIGHT: 147 LBS | BODY MASS INDEX: 23.07 KG/M2 | DIASTOLIC BLOOD PRESSURE: 72 MMHG | OXYGEN SATURATION: 97 % | HEIGHT: 67 IN | SYSTOLIC BLOOD PRESSURE: 108 MMHG

## 2025-06-25 DIAGNOSIS — R10.84 GENERALIZED ABDOMINAL PAIN: Primary | ICD-10-CM

## 2025-06-25 PROCEDURE — 99213 OFFICE O/P EST LOW 20 MIN: CPT | Performed by: FAMILY MEDICINE

## 2025-06-25 RX ORDER — PANTOPRAZOLE SODIUM 40 MG/1
40 TABLET, DELAYED RELEASE ORAL
Qty: 30 TABLET | Refills: 1 | Status: SHIPPED | OUTPATIENT
Start: 2025-06-25 | End: 2025-12-22

## 2025-06-25 RX ORDER — DICYCLOMINE HCL 20 MG
20 TABLET ORAL EVERY 6 HOURS PRN
Qty: 20 TABLET | Refills: 0 | Status: SHIPPED | OUTPATIENT
Start: 2025-06-25

## 2025-06-26 ENCOUNTER — OFFICE VISIT (OUTPATIENT)
Dept: OBGYN CLINIC | Facility: CLINIC | Age: 25
End: 2025-06-26
Payer: COMMERCIAL

## 2025-06-26 ENCOUNTER — NURSE TRIAGE (OUTPATIENT)
Age: 25
End: 2025-06-26

## 2025-06-26 VITALS
WEIGHT: 149 LBS | HEIGHT: 67 IN | DIASTOLIC BLOOD PRESSURE: 74 MMHG | BODY MASS INDEX: 23.39 KG/M2 | SYSTOLIC BLOOD PRESSURE: 112 MMHG

## 2025-06-26 DIAGNOSIS — N89.8 VAGINAL DISCHARGE: Primary | ICD-10-CM

## 2025-06-26 LAB
BV WHIFF TEST VAG QL: NORMAL
CLUE CELLS SPEC QL WET PREP: NORMAL
SL AMB POCT WET MOUNT: NORMAL
T VAGINALIS VAG QL WET PREP: NORMAL
YEAST VAG QL WET PREP: NORMAL

## 2025-06-26 PROCEDURE — 87070 CULTURE OTHR SPECIMN AEROBIC: CPT | Performed by: PHYSICIAN ASSISTANT

## 2025-06-26 PROCEDURE — 99213 OFFICE O/P EST LOW 20 MIN: CPT | Performed by: PHYSICIAN ASSISTANT

## 2025-06-26 PROCEDURE — 87210 SMEAR WET MOUNT SALINE/INK: CPT | Performed by: PHYSICIAN ASSISTANT

## 2025-06-26 NOTE — TELEPHONE ENCOUNTER
"REASON FOR CONVERSATION: Vaginitis    SYMPTOMS: patient states the week before period and the week after period having recurrent yeast. Treated with Diflucan last 6/24. Slight white discharge, no odor, no itch. No fever, no bleeding, no UTI s/sx. Patient leaving for [place], wants appointment asap    OTHER HEALTH INFORMATION: treated w/ Diflucan x2 5/15. Treated with Diflucan x3 ordered 6/11/25    PROTOCOL DISPOSITION: See Within 3 Days in Office    CARE ADVICE PROVIDED: appointment scheduled this AM for f/u with provider.    PRACTICE FOLLOW-UP: HP to provider and clerical team appointment add on today 10.        Reason for Disposition   Patient wants to be seen    Answer Assessment - Initial Assessment Questions  1. SYMPTOM: \"What's the main symptom you're concerned about?\" (e.g., pain, itching, dryness)      Slight white discharge, no itching, no redness, no odor.  2. LOCATION: \"Where is the  s/sx located?\" (e.g., inside/outside, left/right)      Vaginal   3. ONSET: \"When did the  s/sx  start?\"      1.5 months  4. PAIN: \"Is there any pain?\" If Yes, ask: \"How bad is it?\" (Scale: 1-10; mild, moderate, severe)      Denies   5. ITCHING: \"Is there any itching?\" If Yes, ask: \"How bad is it?\" (Scale: 1-10; mild, moderate, severe)      no  6. CAUSE: \"What do you think is causing the discharge?\" \"Have you had the same problem before?\" \"What happened then?\"      Yeast   7. OTHER SYMPTOMS: \"Do you have any other symptoms?\" (e.g., fever, itching, vaginal bleeding, pain with urination, injury to genital area, vaginal foreign body)      Denies   8. PREGNANCY: \"Is there any chance you are pregnant?\" \"When was your last menstrual period?\"      LMP 6/17    Protocols used: Vaginal Symptoms-Adult-[place]    "

## 2025-06-26 NOTE — PROGRESS NOTES
"Name: Chanel Muñoz      : 2000      MRN: [id number]  Encounter Provider: Bernie Nicolas DO  Encounter Date: 2025   Encounter department: [facility]  :  Assessment & Plan  Generalized abdominal pain  Refer to GI  D/c omeprazole and start protonix aily  Trial bentyl prn    Orders:    Ambulatory Referral to Gastroenterology; Future    pantoprazole (PROTONIX) 40 mg tablet; Take 1 tablet (40 mg total) by mouth daily before breakfast (Patient not taking: Reported on 2025)    dicyclomine (BENTYL) 20 mg tablet; Take 1 tablet (20 mg total) by mouth every 6 (six) hours as needed (as needed for abdominal pain) (Patient not taking: Reported on 2025)           History of Present Illness   HPI  Pt presents in f/u.  Notices no change with omeprazole.  Still has bloating, upper abd gnawing and \"hard\" pain which comes and goes.  Labs and US RUQ all nml.  Doesn't have pain currently.  On/off diarrhea/constipation    Review of Systems  See hpi; all other systems negative  Objective   /72 (BP Location: Left arm, Patient Position: Sitting, Cuff Size: Standard)   Pulse 85   Temp (!) 97 °F (36.1 °C)   Resp 16   Ht 5' 7\" (1.702 m)   Wt 66.7 kg (147 lb)   SpO2 97%   BMI 23.02 kg/m²      Physical Exam  Constitutional:       Appearance: Normal appearance.   HENT:      Head: Normocephalic and atraumatic.     Eyes:      Extraocular Movements: Extraocular movements intact.      Conjunctiva/sclera: Conjunctivae normal.       Cardiovascular:      Rate and Rhythm: Normal rate and regular rhythm.      Heart sounds: No murmur heard.     No friction rub. No gallop.   Pulmonary:      Effort: Pulmonary effort is normal.      Breath sounds: Normal breath sounds. No wheezing, rhonchi or rales.   Abdominal:      General: Abdomen is flat.      Palpations: Abdomen is soft. There is no mass.      Tenderness: There is no abdominal tenderness. There is no guarding or rebound.     Neurological:      " General: No focal deficit present.      Mental Status: She is alert and oriented to person, place, and time.

## 2025-06-26 NOTE — PROGRESS NOTES
Assessment & Plan  Vaginal discharge  Reviewed normal, physiological changes in discharge throughout the menstrual cycle; In office wet mount unremarkable; Genital culture obtained - will treat based on results.   Encourage probiotic tablet like acidophilus BID with sx and then qd for maintenance; can also increase yogurt in diet;  Apply OTC hydrocortisone BID x 7-10 days for vulvar itching. Consider trying coconut oil if experiencing vaginal itch/irritation - young right before period - maybe related to dryness  Discontinue use of soaps, shower body wash, sprays or douching in the genital area. Partner should also limit what soap he is using; Avoid all scented products (only plain water to wash vulva), perfume-free/dye-free detergent, no dryer sheets;  Wash with warm water only, gently pat dry, use low setting of hair dryer to dry area.  Avoid restrictive clothing.  Instead wear loose clothing and cotton undergarments.  Sleep without undergarments.  Consistent condom use may also reduce recurrence as well  Can consider restarting OCP when ready - reviewed how to restart, take, and back-up  All questions answered at this time. Patient to call with further questions/concerns  RTO for APE when due or sooner if needed  Orders:    Genital Comprehensive Culture    POCT wet mount      Subjective     HPI   Chanel Muñoz is a 24 y.o. female who presents for possible infection. Reports in April messed up OCP (forgot to take on vacation and restarted where left off when returned) - messed up cycle - ended up with yeast infection symptoms with next period; Seen in office 5/2025 - molecular vaginal panel pos for yeast - treated with Diflucan x 3 tablets; per patient cleared infection symptoms; Now noting a small amount of thin white vaginal discharge; she stopped the BC pill about 2 days ago because unsure if causing infection/symptoms; denies any new soaps/lotions/detergents - had used a new body wash in April prior to first  infection and has stopped useing  No vulvar itch/burn; No vaginal itch/burn; No abn odor; No urinary sx - burning/pain/frequency/hematuria  No abd/pelvic pain; No fever/chills  LMP - 6/17/25; Periods are usually reg q month;    Pt is sexually active in a mutually monog sexual relationship x 8-9 months; Declines sti cultures; Declines hiv/hep testing;   Current contraception: condoms; d/c OCP to see if contributing to yeast infections/symptoms    Last Pap - 2/26/24 - WNL (-) ECC  History of abnormal Pap smear:   Last STI screen - 5/14/25 - C/G/T neg    Review of Systems   Genitourinary:  Positive for vaginal discharge. Negative for difficulty urinating, dysuria, frequency, genital sores, hematuria, menstrual problem, pelvic pain, urgency, vaginal bleeding and vaginal pain.       The following portions of the patient's history were reviewed and updated as appropriate: allergies, current medications, past family history, past medical history, past social history, past surgical history, and problem list.         Past Medical History[1]    Past Surgical History[2]    Family History[3]    Social History     Socioeconomic History    Marital status: Single     Spouse name: Not on file    Number of children: Not on file    Years of education: Not on file    Highest education level: Not on file   Occupational History    Not on file   Tobacco Use    Smoking status: Never     Passive exposure: Never    Smokeless tobacco: Never   Vaping Use    Vaping status: Never Used   Substance and Sexual Activity    Alcohol use: Yes     Alcohol/week: 2.0 standard drinks of alcohol     Comment: social     Drug use: No    Sexual activity: Yes     Partners: Male     Birth control/protection: Condom Male, OCP   Other Topics Concern    Not on file   Social History Narrative    Completing masters in atheletic training     Exercises regularly     Social Drivers of Health     Financial Resource Strain: Low Risk  (2/21/2024)    Overall Financial  "Resource Strain (CARDIA)     Difficulty of Paying Living Expenses: Not hard at all   Food Insecurity: No Food Insecurity (2/21/2024)    Nursing - Inadequate Food Risk Classification     Worried About Running Out of Food in the Last Year: Never true     Ran Out of Food in the Last Year: Never true     Ran Out of Food in the Last Year: Not on file   Transportation Needs: No Transportation Needs (2/21/2024)    PRAPARE - Transportation     Lack of Transportation (Medical): No     Lack of Transportation (Non-Medical): No   Physical Activity: Sufficiently Active (10/20/2020)    Exercise Vital Sign     Days of Exercise per Week: 4 days     Minutes of Exercise per Session: 90 min   Stress: Not on file   Social Connections: Not on file   Intimate Partner Violence: Not on file   Housing Stability: Low Risk  (2/21/2024)    Housing Stability Vital Sign     Unable to Pay for Housing in the Last Year: No     Number of Times Moved in the Last Year: 1     Homeless in the Last Year: No       Current Medications[4]    Allergies[5]    Objective   Vitals:    06/26/25 1003   BP: 112/74   BP Location: Left arm   Patient Position: Sitting   Cuff Size: Standard   Weight: 67.6 kg (149 lb)   Height: 5' 7\" (1.702 m)     Physical Exam  Vitals reviewed.   Constitutional:       General: She is awake. She is not in acute distress.     Appearance: Normal appearance. She is well-developed and well-groomed. She is not ill-appearing, toxic-appearing or diaphoretic.   HENT:      Head: Normocephalic and atraumatic.     Eyes:      Conjunctiva/sclera: Conjunctivae normal.     Abdominal:      General: There is no distension.      Palpations: Abdomen is soft. There is no hepatomegaly, splenomegaly or mass.      Tenderness: There is no abdominal tenderness.      Hernia: No hernia is present. There is no hernia in the left inguinal area or right inguinal area.   Genitourinary:     General: Normal vulva.      Exam position: Supine.      Pubic Area: No rash or " pubic lice.       Labia:         Right: No rash, tenderness, lesion or injury.         Left: No rash, tenderness, lesion or injury.       Urethra: No prolapse, urethral pain, urethral swelling or urethral lesion.      Vagina: No signs of injury and foreign body. Vaginal discharge (small amount of thin, white homogenous discharge) present. No erythema, tenderness, bleeding, lesions or prolapsed vaginal walls.      Cervix: No cervical motion tenderness, discharge, friability, lesion, erythema or cervical bleeding.      Uterus: Not deviated, not enlarged, not fixed, not tender and no uterine prolapse.       Adnexa:         Right: No mass, tenderness or fullness.          Left: No mass, tenderness or fullness.     Lymphadenopathy:      Lower Body: No right inguinal adenopathy. No left inguinal adenopathy.     Skin:     General: Skin is warm and dry.     Neurological:      Mental Status: She is alert and oriented to person, place, and time.     Psychiatric:         Mood and Affect: Mood and affect normal.         Speech: Speech normal.         Behavior: Behavior normal. Behavior is cooperative.         Thought Content: Thought content normal.         Judgment: Judgment normal.              [1]   Past Medical History:  Diagnosis Date    Allergic     Codeine and seasonal allergies    Melanoma (HCC)     toe, elbow chest    Skin cancer 2013   [2]   Past Surgical History:  Procedure Laterality Date    SKIN BIOPSY  2013    SKIN LESION EXCISION Right     4th toe   [3]   Family History  Problem Relation Name Age of Onset    Obesity Mother Shannan Muñoz     Hypertension Father Toro     Melanoma Father Toro     Diabetes Brother Shahbaz         Type 1   [4]   Current Outpatient Medications:     dicyclomine (BENTYL) 20 mg tablet, Take 1 tablet (20 mg total) by mouth every 6 (six) hours as needed (as needed for abdominal pain) (Patient not taking: Reported on 6/26/2025), Disp: 20 tablet, Rfl: 0    Junel FE 1/20 1-20 MG-MCG per tablet,  Take 1 tablet by mouth daily (Patient not taking: Reported on 6/26/2025), Disp: 84 tablet, Rfl: 4    pantoprazole (PROTONIX) 40 mg tablet, Take 1 tablet (40 mg total) by mouth daily before breakfast (Patient not taking: Reported on 6/26/2025), Disp: 30 tablet, Rfl: 1    terconazole (TERAZOL 7) 0.4 % vaginal cream, Insert 1 applicator into the vagina daily at bedtime for 7 days (Patient not taking: Reported on 6/26/2025), Disp: 45 g, Rfl: 0  [5]   Allergies  Allergen Reactions    Codeine Anaphylaxis     Childhood allergy.

## 2025-06-29 LAB — BACTERIA GENITAL AEROBE CULT: NORMAL

## 2025-06-30 LAB — BACTERIA GENITAL AEROBE CULT: NORMAL

## 2025-07-10 ENCOUNTER — NURSE TRIAGE (OUTPATIENT)
Age: 25
End: 2025-07-10

## 2025-07-10 NOTE — TELEPHONE ENCOUNTER
Regarding: Reoccurring BV  ----- Message from Morena COELHO sent at 7/10/2025  8:25 AM EDT -----  Patient has been having reoccurring BV. Patient looking to see Marina in person to see what's going on and get swabbed

## 2025-07-10 NOTE — PROGRESS NOTES
Name: Chanel Muñoz      : 2000      MRN: [id number]  Encounter Provider: SHAWN Mcgregor  Encounter Date: 2025   Encounter department: [facility]  :  Assessment & Plan  Vaginal discharge    Orders:    Molecular Vaginal Panel    Genital Comprehensive Culture    Trichomonas vaginalis/Mycoplasma genitalium PCR    Molecular, genital cx and mycoplasma  collected to identify vaginal infection.  We reviewed normal physiological vaginal discharge throughout menstrual cycle is normal.   Recommend taking probiotic with lactobacillus. Discussed avoiding irritating soaps, lubricants, and lotions. Wear cotton underwear, no tight fitted pants. Recommend avoiding bubble baths and do not stay in wet clothes of swimsuits.   Use white dove bar soap on vulva.   Recommend organic coconut oil for vulvar irritation/itching.  Safe sexual practices discussed.   Will review tests once resulted in Epic and send treatment to pharmacy, if applicable.   Return to office for annual well exam or sooner if needed.        History of Present Illness     HPI    Chanel Muñoz is a 24 y.o. female who presents for frequent vaginitis s/s. She was initially evaluated on 5/15/25 diagnosed with yeast infection, treated with 2 doses of diflucan with relief of symptoms. Called again on 25 with similar symptoms treated again with diflucan. Prescribed terazol on 25 which did not take as she was feeling better.   Evaluated on 25 - negative genital culture.   She states noticed white vaginal discharge, not clumpy. And was having some vaginal itching the past few days. Denies urinary s/s.   She stopped her OCP two weeks ago, has been using condoms. Is sexually active with male partner in a mutually monogamous relationship.   Dove white bar soap, cotton underear, no underwear, looser clothes.     History obtained from: patient    Review of Systems   Constitutional: Negative.     Gastrointestinal: Negative.    Genitourinary:  Positive for vaginal discharge.   Neurological: Negative.    Psychiatric/Behavioral: Negative.       Medical History Reviewed by provider this encounter:  Tobacco  Allergies  Meds  Problems  Med Hx  Surg Hx  Fam Hx     .     Objective   /80 (BP Location: Left arm, Patient Position: Sitting, Cuff Size: Standard)   Wt 68.6 kg (151 lb 3.2 oz)   LMP 06/17/2025   BMI 23.68 kg/m²      Physical Exam  Vitals and nursing note reviewed.   Constitutional:       General: She is not in acute distress.     Appearance: Normal appearance.   Pulmonary:      Effort: Pulmonary effort is normal. No respiratory distress.   Abdominal:      General: Abdomen is flat.      Palpations: Abdomen is soft.   Genitourinary:     General: Normal vulva.      Exam position: Lithotomy position.      Labia:         Right: No rash, tenderness or lesion.         Left: No rash, tenderness or lesion.       Vagina: No signs of injury. No vaginal discharge, erythema, tenderness, bleeding or prolapsed vaginal walls.      Cervix: No cervical motion tenderness, discharge, friability, lesion, erythema or cervical bleeding.      Uterus: Normal. Not tender.       Adnexa:         Right: No tenderness.          Left: No tenderness.       Musculoskeletal:         General: Normal range of motion.     Skin:     General: Skin is dry.      Capillary Refill: Capillary refill takes less than 2 seconds.     Neurological:      General: No focal deficit present.      Mental Status: She is alert and oriented to person, place, and time.     Psychiatric:         Mood and Affect: Mood normal.         Behavior: Behavior normal.         Thought Content: Thought content normal.         Judgment: Judgment normal.

## 2025-07-10 NOTE — TELEPHONE ENCOUNTER
"REASON FOR CONVERSATION: Vaginitis    SYMPTOMS: vaginal irritation/itch-discomfort, vulvar swelling    OTHER HEALTH INFORMATION: states recurrent BV, treated recently with diflucan. Swab negative 6/26/2025. Tried monistat 3 OTC x 2 days and symptoms became worse.  Requesting appt for re-evaluation/swab    PROTOCOL DISPOSITION: See Today or Tomorrow in Office (overriding See Today in Office) Declined appt today.     CARE ADVICE PROVIDED: appointment provided fro 7/11 at 10:15 in  [place] per patient request. Advised to stop monistat due to causing worsening symptoms. Nothing in vagina 24 hours prior to swab.  General genital hygiene- Keep genital area clean and dry.  Wash the vulva area  with mild/sensitive soap. Gently pat dry after washing. Apply aqupahor for barrier/comfrot  Wear loose fitting clothing, cotton underwear during the day.       PRACTICE FOLLOW-UP: n/a             Reason for Disposition   Patient wants to be seen    Answer Assessment - Initial Assessment Questions  1. SYMPTOM: \"What's the main symptom you're concerned about?\" (e.g., rash, itching, swelling, dryness)      Redness, itchiness  2. LOCATION: \"Where is the  itching/irritated located?\" (e.g., inside/outside, left/right)      labia  3. ONSET: \"When did the  irritaion  start?\"      sunday  4. PAIN: \"Is there any pain?\" If Yes, ask: \"How bad is it?\" (Scale: 1-10; mild, moderate, severe)      Denies, discomfort from itching  5. CAUSE: \"What do you think is causing the symptoms?\"      BV in past  6. OTHER SYMPTOMS: \"Do you have any other symptoms?\" (e.g., fever, vaginal bleeding, pain with urination)      swelling  7. PREGNANCY: \"Is there any chance you are pregnant?\" \"When was your last menstrual period?\"      LMP 6/17/2025    Protocols used: Vulvar Symptoms-Adult-[place]    "

## 2025-07-11 ENCOUNTER — OFFICE VISIT (OUTPATIENT)
Age: 25
End: 2025-07-11

## 2025-07-11 VITALS — WEIGHT: 151.2 LBS | SYSTOLIC BLOOD PRESSURE: 122 MMHG | DIASTOLIC BLOOD PRESSURE: 80 MMHG | BODY MASS INDEX: 23.68 KG/M2

## 2025-07-11 DIAGNOSIS — N89.8 VAGINAL DISCHARGE: Primary | ICD-10-CM

## 2025-07-11 PROCEDURE — 87563 M. GENITALIUM AMP PROBE: CPT

## 2025-07-11 PROCEDURE — 81514 NFCT DS BV&VAGINITIS DNA ALG: CPT

## 2025-07-11 PROCEDURE — 87661 TRICHOMONAS VAGINALIS AMPLIF: CPT

## 2025-07-11 PROCEDURE — 87070 CULTURE OTHR SPECIMN AEROBIC: CPT

## 2025-07-12 LAB
C GLABRATA DNA VAG QL NAA+PROBE: NEGATIVE
C KRUSEI DNA VAG QL NAA+PROBE: NEGATIVE
CANDIDA SP 6 PNL VAG NAA+PROBE: NEGATIVE
T VAGINALIS DNA VAG QL NAA+PROBE: NEGATIVE
VAGINOSIS/ITIS DNA PNL VAG PROBE+SIG AMP: NEGATIVE

## 2025-07-13 LAB
BACTERIA GENITAL AEROBE CULT: NORMAL
M GENITALIUM DNA SPEC QL NAA+PROBE: NEGATIVE
T VAGINALIS DNA SPEC QL NAA+PROBE: NEGATIVE

## 2025-07-14 LAB — BACTERIA GENITAL AEROBE CULT: NORMAL
